# Patient Record
Sex: FEMALE | Race: BLACK OR AFRICAN AMERICAN | Employment: OTHER | URBAN - METROPOLITAN AREA
[De-identification: names, ages, dates, MRNs, and addresses within clinical notes are randomized per-mention and may not be internally consistent; named-entity substitution may affect disease eponyms.]

---

## 2018-10-03 ENCOUNTER — ED HISTORICAL/CONVERTED ENCOUNTER (OUTPATIENT)
Dept: OTHER | Age: 28
End: 2018-10-03

## 2018-10-21 ENCOUNTER — ED HISTORICAL/CONVERTED ENCOUNTER (OUTPATIENT)
Dept: OTHER | Age: 28
End: 2018-10-21

## 2018-10-25 LAB
ANTIBODY SCREEN, EXTERNAL: NEGATIVE
CHLAMYDIA, EXTERNAL: NEGATIVE
HBSAG, EXTERNAL: NEGATIVE
HCT, EXTERNAL: 33.2
HGB, EXTERNAL: 11.4
HIV, EXTERNAL: NON REACTIVE
N. GONORRHEA, EXTERNAL: NEGATIVE
PLATELET CNT,   EXTERNAL: 326
RUBELLA, EXTERNAL: NEGATIVE
T. PALLIDUM, EXTERNAL: NEGATIVE
TYPE, ABO & RH, EXTERNAL: NORMAL
VARICELLA, EXTERNAL: NORMAL

## 2019-01-02 ENCOUNTER — ROUTINE PRENATAL (OUTPATIENT)
Dept: OBGYN CLINIC | Age: 29
End: 2019-01-02

## 2019-01-02 ENCOUNTER — OFFICE VISIT (OUTPATIENT)
Dept: OBGYN CLINIC | Age: 29
End: 2019-01-02

## 2019-01-02 VITALS — SYSTOLIC BLOOD PRESSURE: 116 MMHG | BODY MASS INDEX: 24.03 KG/M2 | WEIGHT: 140 LBS | DIASTOLIC BLOOD PRESSURE: 65 MMHG

## 2019-01-02 VITALS
WEIGHT: 140.2 LBS | SYSTOLIC BLOOD PRESSURE: 119 MMHG | HEIGHT: 64 IN | DIASTOLIC BLOOD PRESSURE: 65 MMHG | BODY MASS INDEX: 23.93 KG/M2

## 2019-01-02 DIAGNOSIS — Z34.82 ENCOUNTER FOR SUPERVISION OF OTHER NORMAL PREGNANCY IN SECOND TRIMESTER: Primary | ICD-10-CM

## 2019-01-02 DIAGNOSIS — Z34.92 NORMAL PREGNANCY IN SECOND TRIMESTER: Primary | ICD-10-CM

## 2019-01-02 PROBLEM — O44.02 PLACENTA PREVIA ANTEPARTUM IN SECOND TRIMESTER: Status: ACTIVE | Noted: 2019-01-02

## 2019-01-02 RX ORDER — ONDANSETRON 4 MG/1
4 TABLET, FILM COATED ORAL
COMMUNITY
End: 2019-03-18 | Stop reason: ALTCHOICE

## 2019-01-02 NOTE — PROGRESS NOTES
Chief Complaint Patient presents with  Pregnancy Visit Vitals /65 Wt 140 lb (63.5 kg) LMP 08/11/2018 (Approximate) BMI 24.03 kg/m² FETAL SURVEY A SINGLE VIABLE IUP AT 20W4D GA BY LMP. FETAL CARDIAC MOTION OBSERVED. FETAL ANATOMY WELL VISUALIZED AND APPEARS WITHIN NORMAL LIMITS. NO ABNORMALITIES IDENTIFIED ON TODAYS EXAM. APPROPRIATE GROWTH MEASURED; SIZE = DATES. EASTON AND CERVIX APPEAR WITHIN NORMAL LIMITS. THERE APPEARS TO BE PARTIAL VS COMPLETE PLACENTA PREVIA.  
GENDER: XX

## 2019-01-02 NOTE — PROGRESS NOTES
Subjective:  
 
Luis Frankel is being seen today for transfer of care from [facility]. Desires midwifery care and NCB. This not a planned pregnancy. Her LMP was approximately 8/25/18. With an EDC of  5/20/18 by first trimester US Her obstetrical history is significant for placenta previa seen on US today. Her medical history is benign Her current medications include: Prenatal vitamins, zofran Denies travel to [place] affected area. Last pap early in pregnancy History fully reviewed- see chart ROS negative other than occasional nausea Objective:  
 
Refer to Prenatal Flowsheet and Physical for exam findings. Fundal Height 20 The patient appears well, alert, oriented x 3, in no distress. Visit Vitals /65 Wt 140 lb (63.5 kg) LMP 08/11/2018 (Approximate) BMI 24.03 kg/m² Neck supple. No adenopathy or thyromegaly. Lungs are clear, good air entry, no wheezes, rhonchi or rales. S1 and S2 normal, no murmurs, regular rate and rhythm. Abdomen soft without tenderness, guarding, mass or organomegaly. Extremities show no edema. Neurological is normal, no focal findings. BREAST EXAM: not examined PELVIC EXAM: examination not indicated Pap smear collected Assessment:  
 
Pregnancy at  20 and 4/7 weeks Placenta previa Plan: Will follow placental attachment. Discussed bleeding precautions with patient Will review prenatal record from [facility]. Patient requesting records be sent Continue prenatal vitamins. Add Omega 3 Fatty acids Midwifery care/philosophy discussed including MD collaboration New OB packet given and reviewed, including nutrition, exercise, what to expect at prenatal visits, common complaints, danger signs and practice info. Invited to Centering pregnancy group starting tomorrow. Given brochure with provider, dates, and time. At this time patient desires. Follow-up in 3 weeks

## 2019-01-02 NOTE — PROGRESS NOTES
Subjective:     Rodolph Gilford is being seen today for transfer of care from [facility]. This not a planned pregnancy. Her LMP was approximately 8/25/18. With an [facility] of  5/20/19    Her obstetrical history is significant for placenta previa noted on US today. Her current medications include: Prenatal vitamins, occasional zof  She is seeking midwifery care. Desires [facility]  Denies travel to [place] affected area. Last pap in early pregnancy  Patient is in the [employer]. She is living in the [facility] at [facility]. The FOB is involved. History fully reviewed- see chart      ROS negative other than nausea a couple of times a week    Objective:     Refer to Prenatal Flowsheet and Physical for exam findings. Fundal Height 21    The patient appears well, alert, oriented x 3, in no distress. Visit Vitals  /65   Ht 5' 4\" (1.626 m)   Wt 140 lb 3.2 oz (63.6 kg)   LMP 08/11/2018 (Approximate)   BMI 24.07 kg/m²     Neck supple. No adenopathy or thyromegaly. Lungs are clear, good air entry, no wheezes, rhonchi or rales. S1 and S2 normal, no murmurs, regular rate and rhythm. Abdomen soft without tenderness, guarding, mass or organomegaly. Extremities show no edema. Neurological is normal, no focal findings. BREAST EXAM: not examined    PELVIC EXAM: examination not indicated       Assessment:     Pregnancy at  20 and 4/7 weeks  Placenta previa    Plan:   Welcomed to practice. Give New OB packet and contact info  Continue prenatal vitamins. Add Omega 3 fatty acids  Will follow placenta attachment. Discussed briefly with patient    Midwifery care/philosophy discussed including MD collaboration  New OB packet given and reviewed, including nutrition, exercise, what to expect at prenatal visits, common complaints, danger signs and practice info. Invited to Centering pregnancy group stating tomorrow. Given brochure with provider, dates, and time. At this time patient desire.    Follow-up in 3 weeks if unable to make Centering tomorrow

## 2019-01-24 ENCOUNTER — ROUTINE PRENATAL (OUTPATIENT)
Dept: OBGYN CLINIC | Age: 29
End: 2019-01-24

## 2019-01-24 VITALS — DIASTOLIC BLOOD PRESSURE: 59 MMHG | BODY MASS INDEX: 25.23 KG/M2 | WEIGHT: 147 LBS | SYSTOLIC BLOOD PRESSURE: 113 MMHG

## 2019-01-24 DIAGNOSIS — Z34.82 ENCOUNTER FOR SUPERVISION OF OTHER NORMAL PREGNANCY IN SECOND TRIMESTER: Primary | ICD-10-CM

## 2019-01-25 ENCOUNTER — TELEPHONE (OUTPATIENT)
Dept: OBGYN CLINIC | Age: 29
End: 2019-01-25

## 2019-01-25 NOTE — TELEPHONE ENCOUNTER
CHRISTINE Ashley, ROSIN   Caller: Unspecified (Today,  9:56 AM)             Spoke to patient on the phone.  She reported that she is feeling better.  States she would not have called [place] \"checking all the boxes. \"   Reviewed management of round ligament pain.    Will call if the symptoms persist.

## 2019-01-25 NOTE — TELEPHONE ENCOUNTER
Received call from NP at [facility], advised that patient who is a , 23w6d pregnant, C/O LLQ pain that feels like stretching or pulling. She reports that the pain feels better when laying down but pain increases when positional changes and movement. Patient has not tried medication or a hot shower/bath. Patient denies any other problems with the pregnancy. Patient was seen in the office yesterday and she states that she had the pain yesterday but was not to painful. Today she reports the same. Patient advised that this could be round ligament pain and that she should take 2 tylenol q 6 hours and try a warm compress and or a hot shower/warm bath. Patient requested to see what the provider states and call her back. Please advise.

## 2019-01-25 NOTE — PROGRESS NOTES
Centering session 2  Patricio Agarwal PT and Leslie Quezada, PT present  Reviewed exercise recommendations, body mechanics  Reviewed pregnancy changes and common discomforts  Dental care  Emotional changes in pregnancy discussed    Pt is doing well, no complaints. Discussed need for repeat US for previa at 28 weeks gestation.   Will schedule at next visit  See flowsheet    RTO 3 weeks

## 2019-02-14 DIAGNOSIS — O44.02 PLACENTA PREVIA ANTEPARTUM IN SECOND TRIMESTER: Primary | ICD-10-CM

## 2019-02-18 ENCOUNTER — ROUTINE PRENATAL (OUTPATIENT)
Dept: OBGYN CLINIC | Age: 29
End: 2019-02-18

## 2019-02-18 VITALS — DIASTOLIC BLOOD PRESSURE: 64 MMHG | SYSTOLIC BLOOD PRESSURE: 111 MMHG | BODY MASS INDEX: 26.26 KG/M2 | WEIGHT: 153 LBS

## 2019-02-18 DIAGNOSIS — Z34.82 ENCOUNTER FOR SUPERVISION OF OTHER NORMAL PREGNANCY IN SECOND TRIMESTER: ICD-10-CM

## 2019-02-18 DIAGNOSIS — Z34.80 SUPERVISION OF OTHER NORMAL PREGNANCY, ANTEPARTUM: Primary | ICD-10-CM

## 2019-02-18 DIAGNOSIS — Z23 ENCOUNTER FOR IMMUNIZATION: ICD-10-CM
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
## 2019-02-19 LAB
ERYTHROCYTE [DISTWIDTH] IN BLOOD BY AUTOMATED COUNT: 13.9 % (ref 12.3–15.4)
GLUCOSE 1H P 50 G GLC PO SERPL-MCNC: 91 MG/DL (ref 65–139)
HCT VFR BLD AUTO: 29.4 % (ref 34–46.6)
HGB BLD-MCNC: 10 G/DL (ref 11.1–15.9)
MCH RBC QN AUTO: 30 PG (ref 26.6–33)
MCHC RBC AUTO-ENTMCNC: 34 G/DL (ref 31.5–35.7)
MCV RBC AUTO: 88 FL (ref 79–97)
PLATELET # BLD AUTO: 302 X10E3/UL (ref 150–379)
RBC # BLD AUTO: 3.33 X10E6/UL (ref 3.77–5.28)
WBC # BLD AUTO: 7.7 X10E3/UL (ref 3.4–10.8)

## 2019-02-19 NOTE — PROGRESS NOTES
Spoke with Polina and informed of anemia and need to start iron supplement and vitamin C and encouraged to increase intake of vitamin C. Also informed of normal glucose screening.

## 2019-03-04 ENCOUNTER — ROUTINE PRENATAL (OUTPATIENT)
Dept: OBGYN CLINIC | Age: 29
End: 2019-03-04

## 2019-03-04 VITALS — BODY MASS INDEX: 27.64 KG/M2 | DIASTOLIC BLOOD PRESSURE: 68 MMHG | WEIGHT: 161 LBS | SYSTOLIC BLOOD PRESSURE: 121 MMHG

## 2019-03-04 DIAGNOSIS — O44.02 PLACENTA PREVIA ANTEPARTUM IN SECOND TRIMESTER: ICD-10-CM

## 2019-03-04 DIAGNOSIS — Z34.02 ENCOUNTER FOR SUPERVISION OF NORMAL FIRST PREGNANCY IN SECOND TRIMESTER: ICD-10-CM

## 2019-03-04 NOTE — PROGRESS NOTES
S: Feeling well. No significant complaints or concerns. Reports consistent, daily fetal mvmt. Denies ctxs, LOF, or vaginal bldng. Denies travel to [place] affected area. O: See prenatal flowsheet for maternal and fetal exam  Blood pressure 121/68, weight 161 lb (73 kg), last menstrual period 2018. A:  29w2d   Size=Dates    P: reviewed Us - Previa resolved! See prenatal checklist for other topics covered during today's visit  RTO in 2 weeks for MAURY with CNM  Patient is in [address]. Having difficulties driving home after group since she is staying in [address].

## 2019-03-04 NOTE — PROGRESS NOTES
Chief Complaint   Patient presents with    Routine Prenatal Visit     29w2d     Visit Vitals  /68   Wt 161 lb (73 kg)   LMP 08/11/2018 (Approximate)   BMI 27.64 kg/m²     1. Have you been to the ER, urgent care clinic since your last visit? Hospitalized since your last visit? No    2. Have you seen or consulted any other health care providers outside of the [address] [place] [address] since your last visit? Include any pap smears or colon screening. No     Here today for a routine prenatal visit and she has no complaints or concerns.

## 2019-03-07 ENCOUNTER — OP HISTORICAL/CONVERTED ENCOUNTER (OUTPATIENT)
Dept: OTHER | Age: 29
End: 2019-03-07

## 2019-03-18 ENCOUNTER — ROUTINE PRENATAL (OUTPATIENT)
Dept: OBGYN CLINIC | Age: 29
End: 2019-03-18

## 2019-03-18 VITALS
HEART RATE: 90 BPM | HEIGHT: 64 IN | SYSTOLIC BLOOD PRESSURE: 116 MMHG | DIASTOLIC BLOOD PRESSURE: 69 MMHG | WEIGHT: 164.6 LBS | BODY MASS INDEX: 28.1 KG/M2

## 2019-03-18 DIAGNOSIS — O99.013 ANEMIA AFFECTING PREGNANCY IN THIRD TRIMESTER: ICD-10-CM

## 2019-03-18 DIAGNOSIS — Z34.92 ENCOUNTER FOR SUPERVISION OF NORMAL PREGNANCY IN SECOND TRIMESTER, UNSPECIFIED GRAVIDITY: ICD-10-CM

## 2019-03-18 PROBLEM — O44.02 PLACENTA PREVIA ANTEPARTUM IN SECOND TRIMESTER: Status: RESOLVED | Noted: 2019-01-02 | Resolved: 2019-03-18

## 2019-03-18 NOTE — PROGRESS NOTES
S: Feeling well. No significant complaints or concerns except fatigue. Reports consistent, daily fetal mvmt. Denies ctxs, LOF, or vaginal bldng. [place] [facility] [facility] [place]. O: See prenatal flowsheet for maternal and fetal exam. Reviewed importance of taking supplement for anemia. Has issues taking pills. Will start Floradix supplement BID with orange juice. Blood pressure 116/69, pulse 90, height 5' 4\" (1.626 m), weight 164 lb 9.6 oz (74.7 kg), last menstrual period 2018. A:  31w2d   Size=Dates    PReviewed labor precautions and distance of travel since now lives in [facility] and has concerns with travel distance. Breast feeding class encouraged.    See prenatal checklist for other topics covered during today's visit  RTO in 2 weeks for MAURY with CHRISTINE

## 2019-04-02 ENCOUNTER — ROUTINE PRENATAL (OUTPATIENT)
Dept: OBGYN CLINIC | Age: 29
End: 2019-04-02

## 2019-04-02 VITALS — DIASTOLIC BLOOD PRESSURE: 74 MMHG | WEIGHT: 166 LBS | SYSTOLIC BLOOD PRESSURE: 118 MMHG | BODY MASS INDEX: 28.49 KG/M2

## 2019-04-02 DIAGNOSIS — Z34.03 ENCOUNTER FOR SUPERVISION OF NORMAL FIRST PREGNANCY IN THIRD TRIMESTER: ICD-10-CM

## 2019-04-02 DIAGNOSIS — O99.013 ANEMIA AFFECTING PREGNANCY IN THIRD TRIMESTER: Primary | ICD-10-CM

## 2019-04-02 RX ORDER — LANOLIN ALCOHOL/MO/W.PET/CERES
CREAM (GRAM) TOPICAL 2 TIMES DAILY
COMMUNITY

## 2019-04-02 NOTE — PROGRESS NOTES
Chief Complaint   Patient presents with    Routine Prenatal Visit     Visit Vitals  /74   Wt 166 lb (75.3 kg)   LMP 08/11/2018 (Approximate)   BMI 28.49 kg/m²     1. Have you been to the ER, urgent care clinic since your last visit? Hospitalized since your last visit? No    2. Have you seen or consulted any other health care providers outside of the [address] [place] [address] since your last visit? Include any pap smears or colon screening. No     Here today for a routine prenatal visit and she has no complaints or concerns.

## 2019-04-16 ENCOUNTER — ROUTINE PRENATAL (OUTPATIENT)
Dept: OBGYN CLINIC | Age: 29
End: 2019-04-16

## 2019-04-16 VITALS — DIASTOLIC BLOOD PRESSURE: 72 MMHG | BODY MASS INDEX: 29.18 KG/M2 | WEIGHT: 170 LBS | SYSTOLIC BLOOD PRESSURE: 121 MMHG

## 2019-04-16 DIAGNOSIS — Z34.03 ENCOUNTER FOR SUPERVISION OF NORMAL FIRST PREGNANCY IN THIRD TRIMESTER: ICD-10-CM

## 2019-04-16 DIAGNOSIS — Z34.82 ENCOUNTER FOR SUPERVISION OF OTHER NORMAL PREGNANCY IN SECOND TRIMESTER: Primary | ICD-10-CM

## 2019-04-16 NOTE — LETTER
4/16/2019 9:55 AM 
 
Ms. Nugent [address] [address] To whom it may concern: 
 
Due to Ms Prieto's condition of pregnancy nearing term, I recommend that she reduce her work schedule to 4 days per week. Sincerely, 
 
 
Shanda Loss, CNM

## 2019-04-16 NOTE — PROGRESS NOTES
Chief Complaint   Patient presents with    Routine Prenatal Visit     Visit Vitals  /72   Wt 170 lb (77.1 kg)   LMP 08/11/2018 (Approximate)   BMI 29.18 kg/m²     1. Have you been to the ER, urgent care clinic since your last visit? Hospitalized since your last visit? No    2. Have you seen or consulted any other health care providers outside of the [address] [place] [address] since your last visit? Include any pap smears or colon screening. No     Here today for a routine prenatal visit and she has no complaints or concerns.   gbs today
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
## 2019-04-22 ENCOUNTER — ROUTINE PRENATAL (OUTPATIENT)
Dept: OBGYN CLINIC | Age: 29
End: 2019-04-22

## 2019-04-22 VITALS — SYSTOLIC BLOOD PRESSURE: 122 MMHG | WEIGHT: 172 LBS | BODY MASS INDEX: 29.52 KG/M2 | DIASTOLIC BLOOD PRESSURE: 67 MMHG

## 2019-04-22 DIAGNOSIS — Z34.82 ENCOUNTER FOR SUPERVISION OF OTHER NORMAL PREGNANCY IN SECOND TRIMESTER: Primary | ICD-10-CM

## 2019-04-22 NOTE — PROGRESS NOTES
S: Feeling well. No significant complaints or concerns. Reports consistent, daily fetal mvmt. Denies ctxs, LOF, or vaginal bldng. Stopping work next week. Will be staying with her parents in [address]  Denies travel to [place] affected area. O: See prenatal flowsheet for maternal and fetal exam  Blood pressure 122/67, weight 172 lb (78 kg), last menstrual period 2018.     A:  36w2d   Size=Dates  Fetus OP    P: GBS Cx obtained  Discussed optimal fetal positioning  See prenatal checklist for other topics covered during today's visit  RTO in 1 weeks for MAURY with CHRISTINE

## 2019-04-22 NOTE — PROGRESS NOTES
Chief Complaint   Patient presents with    Routine Prenatal Visit     Visit Vitals  /67   Wt 172 lb (78 kg)   LMP 08/11/2018 (Approximate)   BMI 29.52 kg/m²     1. Have you been to the ER, urgent care clinic since your last visit? Hospitalized since your last visit? No    2. Have you seen or consulted any other health care providers outside of the [facility] since your last visit? Include any pap smears or colon screening. No     Here today for a routine prenatal visit and she has no complaints or concerns.

## 2019-04-24 LAB
GP B STREP DNA SPEC QL NAA+PROBE: NEGATIVE
GRBS, EXTERNAL: NEGATIVE

## 2019-04-28 ENCOUNTER — HOSPITAL ENCOUNTER (EMERGENCY)
Age: 29
Discharge: HOME | End: 2019-04-28
Attending: EMERGENCY MEDICINE | Admitting: EMERGENCY MEDICINE
Payer: OTHER GOVERNMENT

## 2019-04-28 VITALS
OXYGEN SATURATION: 99 % | TEMPERATURE: 98.3 F | WEIGHT: 170 LBS | DIASTOLIC BLOOD PRESSURE: 58 MMHG | SYSTOLIC BLOOD PRESSURE: 117 MMHG | BODY MASS INDEX: 29.02 KG/M2 | HEART RATE: 93 BPM | RESPIRATION RATE: 16 BRPM | HEIGHT: 64 IN

## 2019-04-28 DIAGNOSIS — S91.011A LACERATION OF RIGHT ANKLE, INITIAL ENCOUNTER: Primary | ICD-10-CM

## 2019-04-28 PROCEDURE — 99282 EMERGENCY DEPT VISIT SF MDM: CPT
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
## 2019-04-28 NOTE — DISCHARGE INSTRUCTIONS
Patient Education     Wound Care: After Your Visit  Your Care Instructions  Taking good care of your wound at home will help it heal quickly and reduce your chance of infection. The doctor has checked you carefully, but problems can develop later. If you notice any problems or new symptoms, get medical treatment right away. Follow-up care is a key part of your treatment and safety. Be sure to make and go to all appointments, and call your doctor if you are having problems. It's also a good idea to know your test results and keep a list of the medicines you take. How can you care for yourself at home? · Clean the area with soap and water 2 times a day unless your doctor gives you different instructions. Don't use hydrogen peroxide or alcohol, which can slow healing. ¨ You may cover the wound with a thin layer of antibiotic ointment, such as bacitracin, and a nonstick bandage. ¨ Apply more ointment and replace the bandage as needed. · Take pain medicines exactly as directed. Some pain is normal with a wound, but do not ignore pain that is getting worse instead of better. You could have an infection. ¨ If the doctor gave you a prescription medicine for pain, take it as prescribed. ¨ If you are not taking a prescription pain medicine, ask your doctor if you can take an over-the-counter medicine. · Your doctor may have closed your wound with stitches (sutures), staples, or skin glue. ¨ If you have stitches, your doctor may remove them after several days to 2 weeks. Or you may have stitches that dissolve on their own. ¨ If you have staples, your doctor may remove them after 7 to 10 days. ¨ If your wound was closed with skin glue, the glue will wear off in a few days to 2 weeks. When should you call for help? Call your doctor now or seek immediate medical care if:  · You have signs of infection, such as:  ¨ Increased pain, swelling, warmth, or redness near the wound.   ¨ Red streaks leading from the wound.  ¨ Pus draining from the wound. ¨ A fever. · You bleed so much from your incision that you soak one or more bandages over 2 to 4 hours. Watch closely for changes in your health, and be sure to contact your doctor if:  · The wound is not getting better each day. Where can you learn more? Go to [employer]  Enter M973 in the search box to learn more about \"Wound Care: After Your Visit. \"   © 8086-7684 Health[employer], [employer]orporated. Care instructions adapted under license by [facility] (which disclaims liability or warranty for this information). This care instruction is for use with your licensed healthcare professional. If you have questions about a medical condition or this instruction, always ask your healthcare professional. [address] any warranty or liability for your use of this information. Content Version: 10.5.847283; Last Revised: April 23, 2012                    Cuts: Care Instructions  Your Care Instructions  A cut can happen anywhere on your body. Stitches, staples, skin adhesives, or pieces of tape called Steri-Strips are sometimes used to keep the edges of a cut together and help it heal. Steri-Strips can be used by themselves or with stitches or staples. Sometimes cuts are left open. If the cut went deep and through the skin, the doctor may have closed the cut in two layers. A deeper layer of stitches brings the deep part of the cut together. These stitches will dissolve and don't need to be removed. The upper layer closure, which could be stitches, staples, Steri-Strips, or adhesive, is what you see on the cut. A cut is often covered by a bandage. The doctor has checked you carefully, but problems can develop later. If you notice any problems or new symptoms, get medical treatment right away. Follow-up care is a key part of your treatment and safety.  Be sure to make and go to all appointments, and call your doctor if you are having problems. It's also a good idea to know your test results and keep a list of the medicines you take. How can you care for yourself at home? If a cut is open or closed  · Prop up the sore area on a pillow anytime you sit or lie down during the next 3 days. Try to keep it above the level of your heart. This will help reduce swelling. · Keep the cut dry for the first 24 to 48 hours. After this, you can shower if your doctor okays it. Pat the cut dry. · Don't soak the cut, such as in a bathtub. Your doctor will tell you when it's safe to get the cut wet. · After the first 24 to 48 hours, clean the cut with soap and water 2 times a day unless your doctor gives you different instructions. ? Don't use hydrogen peroxide or alcohol, which can slow healing. ? You may cover the cut with a thin layer of petroleum jelly and a nonstick bandage. ? If the doctor put a bandage over the cut, put on a new bandage after cleaning the cut or if the bandage gets wet or dirty. · Avoid any activity that could cause your cut to reopen. · Be safe with medicines. Read and follow all instructions on the label. ? If the doctor gave you a prescription medicine for pain, take it as prescribed. ? If you are not taking a prescription pain medicine, ask your doctor if you can take an over-the-counter medicine. If the cut is closed with stitches, staples, or Steri-Strips  · Follow the above instructions for open or closed cuts. · Do not remove the stitches or staples on your own. Your doctor will tell you when to come back to have the stitches or staples removed. · Leave Steri-Strips on until they fall off. If the cut is closed with a skin adhesive  · Follow the above instructions for open or closed cuts. · Leave the skin adhesive on your skin until it falls off on its own. This may take 5 to 10 days. · Do not scratch, rub, or pick at the adhesive. · Do not put the sticky part of a bandage directly on the adhesive.   · Do not put any kind of ointment, cream, or lotion over the area. This can make the adhesive fall off too soon. Do not use hydrogen peroxide or alcohol, which can slow healing. When should you call for help? Call your doctor now or seek immediate medical care if:    · You have new pain, or your pain gets worse.     · The skin near the cut is cold or pale or changes color.     · You have tingling, weakness, or numbness near the cut.     · The cut starts to bleed, and blood soaks through the bandage. Oozing small amounts of blood is normal.     · You have trouble moving the area near the cut.     · You have symptoms of infection, such as:  ? Increased pain, swelling, warmth, or redness around the cut.  ? Red streaks leading from the cut.  ? Pus draining from the cut.  ? A fever.    Watch closely for changes in your health, and be sure to contact your doctor if:    · The cut reopens.     · You do not get better as expected. Where can you learn more? Go to http://lalo-humberto.info/. Enter M735 in the search box to learn more about \"Cuts: Care Instructions. \"  Current as of: September 23, 2018  Content Version: 11.9  © 0118-6588 [employer]. Care instructions adapted under license by [employer] (which disclaims liability or warranty for this information). If you have questions about a medical condition or this instruction, always ask your healthcare professional. [address] any warranty or liability for your use of this information.

## 2019-05-01 ENCOUNTER — ROUTINE PRENATAL (OUTPATIENT)
Dept: OBGYN CLINIC | Age: 29
End: 2019-05-01

## 2019-05-01 VITALS — BODY MASS INDEX: 29.18 KG/M2 | SYSTOLIC BLOOD PRESSURE: 126 MMHG | DIASTOLIC BLOOD PRESSURE: 75 MMHG | WEIGHT: 170 LBS

## 2019-05-01 DIAGNOSIS — Z34.03 ENCOUNTER FOR SUPERVISION OF NORMAL FIRST PREGNANCY IN THIRD TRIMESTER: Primary | ICD-10-CM

## 2019-05-01 NOTE — PROGRESS NOTES
Chief Complaint   Patient presents with    Routine Prenatal Visit     37w4d     Visit Vitals  /75   Wt 170 lb (77.1 kg)   LMP 08/11/2018 (Approximate)   BMI 29.18 kg/m²     1. Have you been to the ER, urgent care clinic since your last visit? Hospitalized since your last visit? No    2. Have you seen or consulted any other health care providers outside of the [address] [place] [address] since your last visit? Include any pap smears or colon screening. No     Here today for a routine prenatal visit and she has no complaints or concerns.

## 2019-05-01 NOTE — PROGRESS NOTES
S: Feeling well. No significant complaints or concerns. Reports consistent, daily fetal mvmt. Denies ctxs, LOF, or vaginal bldng. Having [place] contractions  Denies travel to [facility] affected area. O: See prenatal flowsheet for maternal and fetal exam  Blood pressure 126/75, weight 170 lb (77.1 kg), last menstrual period 2018.     A:  37w4d   Size=Dates    P: reviewed GBS negative result  See prenatal checklist for other topics covered during today's visit  RTO in 1 weeks for MAURY with CNM

## 2019-05-08 ENCOUNTER — ROUTINE PRENATAL (OUTPATIENT)
Dept: OBGYN CLINIC | Age: 29
End: 2019-05-08

## 2019-05-08 VITALS — BODY MASS INDEX: 29.7 KG/M2 | WEIGHT: 173 LBS | SYSTOLIC BLOOD PRESSURE: 126 MMHG | DIASTOLIC BLOOD PRESSURE: 80 MMHG

## 2019-05-08 DIAGNOSIS — Z34.03 ENCOUNTER FOR SUPERVISION OF NORMAL FIRST PREGNANCY IN THIRD TRIMESTER: Primary | ICD-10-CM

## 2019-05-08 NOTE — PROGRESS NOTES
S: Feeling well. No significant complaints or concerns. Reports consistent, daily fetal mvmt. Denies ctxs, LOF, or vaginal bldng. Denies travel to [facility] affected area. O: See prenatal flowsheet for maternal and fetal exam  Blood pressure 126/80, weight 173 lb (78.5 kg), last menstrual period 2018. A:  38w4d   Size=Dates    P: labor precautions, fkcs. Spinning babies encouraged.    See prenatal checklist for other topics covered during today's visit  RTO in 1 weeks for MAURY with CHRISTINE

## 2019-05-08 NOTE — PATIENT INSTRUCTIONS
Week 39 of Your Pregnancy: Care Instructions  Your Care Instructions    During these final weeks, you may feel anxious to see your new baby. [place] babies often look different from what you see in pictures or movies. Right after birth, their heads may have a strange shape. Their eyes may be puffy. And their genitals may be swollen. They may also have very dry skin, or red marks on the eyelids, nose, or neck. Still, most parents think their babies are beautiful. Follow-up care is a key part of your treatment and safety. Be sure to make and go to all appointments, and call your doctor if you are having problems. It's also a good idea to know your test results and keep a list of the medicines you take. How can you care for yourself at home? Prepare to breastfeed  · If you are breastfeeding, continue to eat healthy foods. · If your health care provider recommends it, keep taking your prenatal vitamins. · Talk to your doctor before you take any medicine or supplement. That's because some medicines can affect your breast milk. · You can help prevent sore nipples if you feed your baby in the correct position. Nurses will help you learn to do this. · Your  will need to be fed about every 1 to 3 hours. Choose the right birth control after your baby is born  · Women who are breastfeeding can still get pregnant. Use birth control if you don't want to get pregnant. · Intrauterine devices (IUDs) work for women who want to wait at least 2 years before getting pregnant again. They are safe to use while you are breastfeeding. · Depo-Provera can be used while you are breastfeeding. It is a shot you get every 3 months. · Birth control pills work well. But you need a different kind of pill while you are breastfeeding. And when you start taking these pills, you need to make sure to use another type of birth control until you start your second pack.   · Diaphragms, cervical caps, tubal implants, and condoms with spermicide work less well after birth. If you have a diaphragm or cervical cap, you will need to have it refitted. · Tubal ligation (tying your tubes) and vasectomy are both permanent. These are good options if you are sure you are done having children. Where can you learn more? Go to http://lalo-humberto.info/. Enter F390 in the search box to learn more about \"Week 39 of Your Pregnancy: Care Instructions. \"  Current as of: 2018  Content Version: 11.9  © 9854-1407 [employer]. Care instructions adapted under license by [employer] (which disclaims liability or warranty for this information). If you have questions about a medical condition or this instruction, always ask your healthcare professional. [address] any warranty or liability for your use of this information. Week 38 of Your Pregnancy: Care Instructions  Your Care Instructions    Believe it or not, your baby is almost here. You may have ideas about your baby's personality because of how much he or she moves. Or you may have noticed how he or she responds to sounds, warmth, cold, and light. You may even know what kind of music your baby likes. By now, you have a better idea of what to expect during delivery. You may have talked about your birth preferences with your doctor. But even if you want a vaginal birth, it is a good idea to learn about  births.  birth means that your baby is born through a cut (incision) in your lower belly. It is sometimes the best choice for the health of the baby and the mother. This care sheet can help you understand  births. It also gives you information about what to expect after your baby is born. And it helps you understand more about postpartum depression. Follow-up care is a key part of your treatment and safety. Be sure to make and go to all appointments, and call your doctor if you are having problems. It's also a good idea to know your test results and keep a list of the medicines you take. How can you care for yourself at home? Learn about  birth  · Most C-sections are unplanned. They are done because of problems that occur during labor. These problems might include:  ? Labor that slows or stops. ? High blood pressure or other problems for the mother. ? Signs of distress in the baby. These signs may include a very fast or slow heart rate. · Although most mothers and babies do well after , it is major surgery. It has more risks than a vaginal delivery. · In some cases, a planned  may be safer than a vaginal delivery. This may be the case if:  ? The mother has a health problem, such as a heart condition. ? The baby isn't in a head-down position for delivery. This is called a breech position. ? The uterus has scars from past surgeries. This could increase the chance of a tear in the uterus. ? There is a problem with the placenta. ? The mother has an infection, such as genital herpes, that could be spread to the baby. ? The mother is having twins or more. ? The baby weighs 9 to 10 pounds or more. · Because of the risks of , planned C-sections generally should be done only for medical reasons. And a planned  should be done at 39 weeks or later unless there is a medical reason to do it sooner. Know what to expect after delivery, and plan for the first few weeks at home  · You, your baby, and your partner or  will get identification bands. Only people with matching bands can  the baby from the nursery. · You will learn how to feed, diaper, and bathe your baby. And you will learn how to care for the umbilical cord stump. If your baby will be circumcised, you will also learn how to care for that. · Ask people to wait to visit you until you are at home. And ask them to wash their hands before they touch your baby.   · Make sure you have another adult in your home for at least 2 or 3 days after the birth. · During the first 2 weeks, limit when friends and family can visit. · Do not allow visitors who have colds or infections. Make sure all visitors are up to date with their vaccinations. Never let anyone smoke around your baby. · Try to nap when the baby naps. Be aware of postpartum depression  · \"Baby blues\" are common for the first 1 to 2 weeks after birth. You may cry or feel sad or irritable for no reason. · For some women, these feelings last longer and are more intense. This is called postpartum depression. · If your symptoms last for more than a few weeks or you feel very depressed, ask your doctor for help. · Postpartum depression can be treated. Support groups and counseling can help. Sometimes medicine can also help. Where can you learn more? Go to http://lalo-humberto.info/. Enter B044 in the search box to learn more about \"Week 38 of Your Pregnancy: Care Instructions. \"  Current as of: September 5, 2018  Content Version: 11.9  © 4421-1872 Healthwise, Incorporated. Care instructions adapted under license by [employer] (which disclaims liability or warranty for this information). If you have questions about a medical condition or this instruction, always ask your healthcare professional. [address] any warranty or liability for your use of this information.

## 2019-05-08 NOTE — PROGRESS NOTES
Chief Complaint   Patient presents with    Routine Prenatal Visit     Visit Vitals  /80   Wt 173 lb (78.5 kg)   LMP 08/11/2018 (Approximate)   BMI 29.70 kg/m²     1. Have you been to the ER, urgent care clinic since your last visit? Hospitalized since your last visit? No    2. Have you seen or consulted any other health care providers outside of the [address] [place] [address] since your last visit? Include any pap smears or colon screening. No    Here today for a routine prenatal visit and she has no complaints or concerns.

## 2019-05-14 ENCOUNTER — TELEPHONE (OUTPATIENT)
Dept: OBGYN CLINIC | Age: 29
End: 2019-05-14

## 2019-05-14 NOTE — TELEPHONE ENCOUNTER
Patient calling , 39w3d pregnant, states that she would like to speak with her midwife. This nurse offered her help, but she declined.     She is requesting a return call to [phone]

## 2019-05-15 ENCOUNTER — ROUTINE PRENATAL (OUTPATIENT)
Dept: OBGYN CLINIC | Age: 29
End: 2019-05-15

## 2019-05-15 VITALS
HEIGHT: 64 IN | DIASTOLIC BLOOD PRESSURE: 72 MMHG | SYSTOLIC BLOOD PRESSURE: 120 MMHG | WEIGHT: 178.4 LBS | BODY MASS INDEX: 30.46 KG/M2

## 2019-05-15 DIAGNOSIS — Z34.03 ENCOUNTER FOR SUPERVISION OF NORMAL FIRST PREGNANCY IN THIRD TRIMESTER: Primary | ICD-10-CM

## 2019-05-15 NOTE — TELEPHONE ENCOUNTER
Spoke to patient on the phone. She wants her membranes stripped tomorrow. \"I don't think this baby will ever come. \"  I told her I would check her cervix tomorrow if she wanted. Will assess cervical ripening.

## 2019-05-16 ENCOUNTER — HOSPITAL ENCOUNTER (EMERGENCY)
Age: 29
Discharge: HOME | End: 2019-05-17
Attending: OBSTETRICS & GYNECOLOGY | Admitting: OBSTETRICS & GYNECOLOGY
Payer: OTHER GOVERNMENT

## 2019-05-16 LAB
APPEARANCE UR: ABNORMAL
BILIRUB UR QL: NEGATIVE
COLOR UR: YELLOW
GLUCOSE UR QL STRIP.AUTO: NEGATIVE MG/DL
KETONES UR-MCNC: NEGATIVE MG/DL
LEUKOCYTE ESTERASE UR QL STRIP: NEGATIVE
NITRITE UR QL: NEGATIVE
PH UR: 7 [PH] (ref 5–9)
PROT UR QL: NEGATIVE MG/DL
RBC # UR STRIP: ABNORMAL /UL
SERVICE CMNT-IMP: ABNORMAL
SP GR UR: 1.01 (ref 1–1.02)
UROBILINOGEN UR QL: 0.2 EU/DL (ref 0.2–1)

## 2019-05-16 PROCEDURE — 81003 URINALYSIS AUTO W/O SCOPE: CPT

## 2019-05-17 VITALS
RESPIRATION RATE: 16 BRPM | TEMPERATURE: 98.3 F | DIASTOLIC BLOOD PRESSURE: 72 MMHG | SYSTOLIC BLOOD PRESSURE: 124 MMHG | HEART RATE: 78 BPM

## 2019-05-17 PROCEDURE — 59025 FETAL NON-STRESS TEST: CPT

## 2019-05-17 PROCEDURE — 99282 EMERGENCY DEPT VISIT SF MDM: CPT

## 2019-05-17 NOTE — DISCHARGE INSTRUCTIONS
Patient Education   Patient Education   Patient Education        Week 39 of Your Pregnancy: Care Instructions  Your Care Instructions    During these final weeks, you may feel anxious to see your new baby.  babies often look different from what you see in pictures or movies. Right after birth, their heads may have a strange shape. Their eyes may be puffy. And their genitals may be swollen. They may also have very dry skin, or red marks on the eyelids, nose, or neck. Still, most parents think their babies are beautiful. Follow-up care is a key part of your treatment and safety. Be sure to make and go to all appointments, and call your doctor if you are having problems. It's also a good idea to know your test results and keep a list of the medicines you take. How can you care for yourself at home? Prepare to breastfeed  · If you are breastfeeding, continue to eat healthy foods. · If your health care provider recommends it, keep taking your prenatal vitamins. · Talk to your doctor before you take any medicine or supplement. That's because some medicines can affect your breast milk. · You can help prevent sore nipples if you feed your baby in the correct position. Nurses will help you learn to do this. · Your  will need to be fed about every 1 to 3 hours. Choose the right birth control after your baby is born  · Women who are breastfeeding can still get pregnant. Use birth control if you don't want to get pregnant. · Intrauterine devices (IUDs) work for women who want to wait at least 2 years before getting pregnant again. They are safe to use while you are breastfeeding. · Depo-Provera can be used while you are breastfeeding. It is a shot you get every 3 months. · Birth control pills work well. But you need a different kind of pill while you are breastfeeding.  And when you start taking these pills, you need to make sure to use another type of birth control until you start your second pack.  · Diaphragms, cervical caps, tubal implants, and condoms with spermicide work less well after birth. If you have a diaphragm or cervical cap, you will need to have it refitted. · Tubal ligation (tying your tubes) and vasectomy are both permanent. These are good options if you are sure you are done having children. Where can you learn more? Go to http://lalo-humberto.info/. Enter X068 in the search box to learn more about \"Week 39 of Your Pregnancy: Care Instructions. \"  Current as of: September 5, 2018  Content Version: 11.9  © 6980-7286 [employer]. Care instructions adapted under license by [employer] (which disclaims liability or warranty for this information). If you have questions about a medical condition or this instruction, always ask your healthcare professional. [address] any warranty or liability for your use of this information. Counting Your Baby's Kicks: Care Instructions  Your Care Instructions    Counting your baby's kicks is one way your doctor can tell that your baby is healthy. Most women--especially in a first pregnancy--feel their baby move for the first time between 16 and 22 weeks. The movement may feel like flutters rather than kicks. Your baby may move more at certain times of the day. When you are active, you may notice less kicking than when you are resting. At your prenatal visits, your doctor will ask whether the baby is active. In your last trimester, your doctor may ask you to count the number of times you feel your baby move. Follow-up care is a key part of your treatment and safety. Be sure to make and go to all appointments, and call your doctor if you are having problems. It's also a good idea to know your test results and keep a list of the medicines you take. How do you count fetal kicks?   · A common method of checking your baby's movement is to count the number of kicks or moves you feel in 1 hour. Ten movements (such as kicks, flutters, or rolls) in 1 hour are normal. Some doctors suggest that you count in the morning until you get to 10 movements. Then you can quit for that day and start again the next day. · Pick your baby's most active time of day to count. This may be any time from morning to evening. · If you do not feel 10 movements in an hour, your baby may be sleeping. Wait for the next hour and count again. When should you call for help? Call your doctor now or seek immediate medical care if:    · You noticed that your baby has stopped moving or is moving much less than normal.    Watch closely for changes in your health, and be sure to contact your doctor if you have any problems. Where can you learn more? Go to http://lalo-humberto.info/. Enter M005 in the search box to learn more about \"Counting Your Baby's Kicks: Care Instructions. \"  Current as of: 2018  Content Version: 11.9  © 7294-0405 [employer]. Care instructions adapted under license by [employer] (which disclaims liability or warranty for this information). If you have questions about a medical condition or this instruction, always ask your healthcare professional. [place] [address] any warranty or liability for your use of this information. Pregnancy Precautions: Care Instructions  Your Care Instructions    There is no sure way to prevent labor before your due date ( labor) or to prevent most other pregnancy problems. But there are things you can do to increase your chances of a healthy pregnancy. Go to your appointments, follow your doctor's advice, and take good care of yourself. Eat well, and exercise (if your doctor agrees). And make sure to drink plenty of water. Follow-up care is a key part of your treatment and safety. Be sure to make and go to all appointments, and call your doctor if you are having problems.  It's also a good idea to know your test results and keep a list of the medicines you take. How can you care for yourself at home? · Make sure you go to your prenatal appointments. At each visit, your doctor will check your blood pressure. Your doctor will also check to see if you have protein in your urine. High blood pressure and protein in urine are signs of preeclampsia. This condition can be dangerous for you and your baby. · Drink plenty of fluids, enough so that your urine is light yellow or clear like water. Dehydration can cause contractions. If you have kidney, heart, or liver disease and have to limit fluids, talk with your doctor before you increase the amount of fluids you drink. · Tell your doctor right away if you notice any symptoms of an infection, such as:  ? Burning when you urinate. ? A foul-smelling discharge from your vagina. ? Vaginal itching. ? Unexplained fever. ? Unusual pain or soreness in your uterus or lower belly. · Eat a balanced diet. Include plenty of foods that are high in calcium and iron. ? Foods high in calcium include milk, cheese, yogurt, almonds, and broccoli. ? Foods high in iron include red meat, shellfish, poultry, eggs, beans, raisins, whole-grain bread, and leafy green vegetables. · Do not smoke. If you need help quitting, talk to your doctor about stop-smoking programs and medicines. These can increase your chances of quitting for good. · Do not drink alcohol or use illegal drugs. · Follow your doctor's directions about activity. Your doctor will let you know how much, if any, exercise you can do. · Ask your doctor if you can have sex. If you are at risk for early labor, your doctor may ask you to not have sex. · Take care to prevent falls. During pregnancy, your joints are loose, and your balance is off. Sports such as bicycling, skiing, or in-line skating can increase your risk of falling.  And don't ride horses or motorcycles, dive, water ski, scuba dive, or parachute jump while you are pregnant. · Avoid getting very hot. Do not use saunas or hot tubs. Avoid staying out in the sun in hot weather for long periods. Take acetaminophen (Tylenol) to lower a high fever. · Do not take any over-the-counter or herbal medicines or supplements without talking to your doctor or pharmacist first.  When should you call for help? Call 911 anytime you think you may need emergency care. For example, call if:    · You passed out (lost consciousness).     · You have severe vaginal bleeding.     · You have severe pain in your belly or pelvis.     · You have had fluid gushing or leaking from your vagina and you know or think the umbilical cord is bulging into your vagina. If this happens, immediately get down on your knees so your rear end (buttocks) is higher than your head. This will decrease the pressure on the cord until help arrives.   [facility] your doctor now or seek immediate medical care if:    · You have signs of preeclampsia, such as:  ? Sudden swelling of your face, hands, or feet. ? New vision problems (such as dimness or blurring). ? A severe headache.     · You have any vaginal bleeding.     · You have belly pain or cramping.     · You have a fever.     · You have had regular contractions (with or without pain) for an hour. This means that you have 8 or more within 1 hour or 4 or more in 20 minutes after you change your position and drink fluids.     · You have a sudden release of fluid from your vagina.     · You have low back pain or pelvic pressure that does not go away.     · You notice that your baby has stopped moving or is moving much less than normal.    Watch closely for changes in your health, and be sure to contact your doctor if you have any problems. Where can you learn more? Go to http://lalo-humberto.info/. Enter [id number] in the search box to learn more about \"Pregnancy Precautions: Care Instructions. \"  Current as of: September 5, 2018  Content Version: 11.9  © 9996-5558 [employer]. Care instructions adapted under license by [employer] (which disclaims liability or warranty for this information). If you have questions about a medical condition or this instruction, always ask your healthcare professional. [address] any warranty or liability for your use of this information.

## 2019-05-17 NOTE — PROGRESS NOTES
9074 Patient arrives to unit with complaints of swelling for the past couple days with intermittent headaches. Patient is . 39w5d. 
 
0028 SVE 2. Cornell Patel paged. 36 SBAR discussed with JADEN Patel. Orders received to discharge patient at this time. 0040 I have reviewed discharge instructions with the patient. The patient verbalized understanding.

## 2019-05-18 ENCOUNTER — HOSPITAL ENCOUNTER (INPATIENT)
Age: 29
LOS: 3 days | Discharge: HOME | End: 2019-05-21
Attending: OBSTETRICS & GYNECOLOGY | Admitting: OBSTETRICS & GYNECOLOGY
Payer: OTHER GOVERNMENT

## 2019-05-18 ENCOUNTER — ANESTHESIA EVENT (OUTPATIENT)
Dept: LABOR AND DELIVERY | Age: 29
End: 2019-05-18
Payer: OTHER GOVERNMENT

## 2019-05-18 ENCOUNTER — ANESTHESIA (OUTPATIENT)
Dept: LABOR AND DELIVERY | Age: 29
End: 2019-05-18
Payer: OTHER GOVERNMENT

## 2019-05-18 ENCOUNTER — HOSPITAL ENCOUNTER (EMERGENCY)
Age: 29
Discharge: HOME | End: 2019-05-18
Attending: OBSTETRICS & GYNECOLOGY | Admitting: OBSTETRICS & GYNECOLOGY
Payer: OTHER GOVERNMENT

## 2019-05-18 VITALS
WEIGHT: 178 LBS | DIASTOLIC BLOOD PRESSURE: 77 MMHG | HEIGHT: 64 IN | OXYGEN SATURATION: 100 % | SYSTOLIC BLOOD PRESSURE: 132 MMHG | RESPIRATION RATE: 18 BRPM | BODY MASS INDEX: 30.39 KG/M2 | HEART RATE: 81 BPM | TEMPERATURE: 98.4 F

## 2019-05-18 LAB
BASOPHILS # BLD: 0 K/UL (ref 0–0.1)
BASOPHILS NFR BLD: 0 % (ref 0–1)
DIFFERENTIAL METHOD BLD: ABNORMAL
EOSINOPHIL # BLD: 0 K/UL (ref 0–0.4)
EOSINOPHIL NFR BLD: 0 % (ref 0–7)
ERYTHROCYTE [DISTWIDTH] IN BLOOD BY AUTOMATED COUNT: 15.6 % (ref 11.5–14.5)
HCT VFR BLD AUTO: 36.6 % (ref 35–47)
HGB BLD-MCNC: 11.7 G/DL (ref 11.5–16)
IMM GRANULOCYTES # BLD AUTO: 0.1 K/UL (ref 0–0.04)
IMM GRANULOCYTES NFR BLD AUTO: 1 % (ref 0–0.5)
LYMPHOCYTES # BLD: 1.6 K/UL (ref 0.8–3.5)
LYMPHOCYTES NFR BLD: 13 % (ref 12–49)
MCH RBC QN AUTO: 28.5 PG (ref 26–34)
MCHC RBC AUTO-ENTMCNC: 32 G/DL (ref 30–36.5)
MCV RBC AUTO: 89.1 FL (ref 80–99)
MONOCYTES # BLD: 0.8 K/UL (ref 0–1)
MONOCYTES NFR BLD: 7 % (ref 5–13)
NEUTS SEG # BLD: 9.3 K/UL (ref 1.8–8)
NEUTS SEG NFR BLD: 79 % (ref 32–75)
NRBC # BLD: 0 K/UL (ref 0–0.01)
NRBC BLD-RTO: 0 PER 100 WBC
PLATELET # BLD AUTO: 341 K/UL (ref 150–400)
PMV BLD AUTO: 8.9 FL (ref 8.9–12.9)
RBC # BLD AUTO: 4.11 M/UL (ref 3.8–5.2)
WBC # BLD AUTO: 11.7 K/UL (ref 3.6–11)

## 2019-05-18 PROCEDURE — 4A1H74Z MONITORING OF PRODUCTS OF CONCEPTION, CARDIAC ELECTRICAL ACTIVITY, VIA NATURAL OR ARTIFICIAL OPENING: ICD-10-PCS | Performed by: OBSTETRICS & GYNECOLOGY

## 2019-05-18 PROCEDURE — 75810000275 HC EMERGENCY DEPT VISIT NO LEVEL OF CARE

## 2019-05-18 PROCEDURE — 74011000250 HC RX REV CODE- 250: Performed by: ANESTHESIOLOGY

## 2019-05-18 PROCEDURE — 96375 TX/PRO/DX INJ NEW DRUG ADDON: CPT

## 2019-05-18 PROCEDURE — 96360 HYDRATION IV INFUSION INIT: CPT

## 2019-05-18 PROCEDURE — 75410000002 HC LABOR FEE PER 1 HR: Performed by: OBSTETRICS & GYNECOLOGY

## 2019-05-18 PROCEDURE — 74011250636 HC RX REV CODE- 250/636: Performed by: ANESTHESIOLOGY

## 2019-05-18 PROCEDURE — 77030011943

## 2019-05-18 PROCEDURE — 59025 FETAL NON-STRESS TEST: CPT

## 2019-05-18 PROCEDURE — 65270000029 HC RM PRIVATE

## 2019-05-18 PROCEDURE — 77030014125 HC TY EPDRL BBMI -B: Performed by: ANESTHESIOLOGY

## 2019-05-18 PROCEDURE — 99285 EMERGENCY DEPT VISIT HI MDM: CPT

## 2019-05-18 PROCEDURE — 76060000078 HC EPIDURAL ANESTHESIA: Performed by: ANESTHESIOLOGY

## 2019-05-18 PROCEDURE — 36415 COLL VENOUS BLD VENIPUNCTURE: CPT

## 2019-05-18 PROCEDURE — 74011250636 HC RX REV CODE- 250/636: Performed by: ADVANCED PRACTICE MIDWIFE

## 2019-05-18 PROCEDURE — 74011000250 HC RX REV CODE- 250

## 2019-05-18 PROCEDURE — 96374 THER/PROPH/DIAG INJ IV PUSH: CPT

## 2019-05-18 PROCEDURE — 96361 HYDRATE IV INFUSION ADD-ON: CPT

## 2019-05-18 PROCEDURE — 85025 COMPLETE CBC W/AUTO DIFF WBC: CPT

## 2019-05-18 RX ORDER — SODIUM CHLORIDE 0.9 % (FLUSH) 0.9 %
5-40 SYRINGE (ML) INJECTION AS NEEDED
Status: CANCELLED | OUTPATIENT
Start: 2019-05-18

## 2019-05-18 RX ORDER — LIDOCAINE HYDROCHLORIDE 10 MG/ML
10 INJECTION INFILTRATION; PERINEURAL AS NEEDED
Status: DISCONTINUED | OUTPATIENT
Start: 2019-05-18 | End: 2019-05-19 | Stop reason: HOSPADM

## 2019-05-18 RX ORDER — BUPIVACAINE HYDROCHLORIDE 2.5 MG/ML
INJECTION, SOLUTION EPIDURAL; INFILTRATION; INTRACAUDAL AS NEEDED
Status: DISCONTINUED | OUTPATIENT
Start: 2019-05-18 | End: 2019-05-19 | Stop reason: HOSPADM

## 2019-05-18 RX ORDER — SODIUM CHLORIDE 0.9 % (FLUSH) 0.9 %
5-40 SYRINGE (ML) INJECTION EVERY 8 HOURS
Status: CANCELLED | OUTPATIENT
Start: 2019-05-18

## 2019-05-18 RX ORDER — OXYTOCIN/0.9 % SODIUM CHLORIDE 30/500 ML
500 PLASTIC BAG, INJECTION (ML) INTRAVENOUS ONCE
Status: COMPLETED | OUTPATIENT
Start: 2019-05-18 | End: 2019-05-19

## 2019-05-18 RX ORDER — FENTANYL/BUPIVACAINE/NS/PF 2-1250MCG
1-16 PREFILLED PUMP RESERVOIR EPIDURAL CONTINUOUS
Status: DISCONTINUED | OUTPATIENT
Start: 2019-05-18 | End: 2019-05-19 | Stop reason: HOSPADM

## 2019-05-18 RX ORDER — BUTORPHANOL TARTRATE 2 MG/ML
2 INJECTION INTRAMUSCULAR; INTRAVENOUS ONCE
Status: COMPLETED | OUTPATIENT
Start: 2019-05-18 | End: 2019-05-18

## 2019-05-18 RX ADMIN — BUPIVACAINE HYDROCHLORIDE 10 ML: 2.5 INJECTION, SOLUTION EPIDURAL; INFILTRATION; INTRACAUDAL at 18:18

## 2019-05-18 RX ADMIN — SODIUM CHLORIDE, SODIUM LACTATE, POTASSIUM CHLORIDE, AND CALCIUM CHLORIDE 500 ML: 600; 310; 30; 20 INJECTION, SOLUTION INTRAVENOUS at 18:35

## 2019-05-18 RX ADMIN — SODIUM CHLORIDE, SODIUM LACTATE, POTASSIUM CHLORIDE, AND CALCIUM CHLORIDE 1000 ML: 600; 310; 30; 20 INJECTION, SOLUTION INTRAVENOUS at 17:26

## 2019-05-18 RX ADMIN — SODIUM CHLORIDE, SODIUM LACTATE, POTASSIUM CHLORIDE, AND CALCIUM CHLORIDE 1000 ML: 600; 310; 30; 20 INJECTION, SOLUTION INTRAVENOUS at 07:12

## 2019-05-18 RX ADMIN — PROMETHAZINE HYDROCHLORIDE 12.5 MG: 25 INJECTION INTRAMUSCULAR; INTRAVENOUS at 09:05

## 2019-05-18 RX ADMIN — BUTORPHANOL TARTRATE 2 MG: 2 INJECTION, SOLUTION INTRAMUSCULAR; INTRAVENOUS at 09:05

## 2019-05-18 RX ADMIN — Medication 10 ML/HR: at 18:38

## 2019-05-18 RX ADMIN — SODIUM CHLORIDE, SODIUM LACTATE, POTASSIUM CHLORIDE, AND CALCIUM CHLORIDE 1000 ML: 600; 310; 30; 20 INJECTION, SOLUTION INTRAVENOUS at 23:09

## 2019-05-18 NOTE — PROGRESS NOTES
1500 Patient arrived on the unit with complaints of contractions. Will monitor. CNM aware of arrival. 
 
[address] [place], [place] and Talha Ga at the bedside assessing the patient and getting consent for delivery. 1512 SVE 4/100/-1 by Jenise Moreno RN received orders to admit patient, place IV and send lab.  
 
1523 20ga IV placed in right hand, flushed and patient tolerated well. 1740 Patient on the labor ball. EFM on. CNM at the bedside. 1800 Dr Dawn Dance at the bedside assessing patient and getting consent for epidural. Patient agree and is placed on proper position. 0 Timeout done with MD Patient and RN. All agree. Epidural placed, see MD notes. After placement patient was assisted to the supine position with right hip tilt. BP cuff on and monitoring. [id number] [facility], CNM at the bedside. SVE 7/100/-1, SROM, clear fluid. No new orders at this this. Will continue to monitor. 1900 Bedside and Verbal shift change report given to Tatiana Moreno RN (oncoming nurse) by Mirtha Ibrahim RN (offgoing nurse). Report included the following information SBAR, Kardex and MAR.

## 2019-05-18 NOTE — PROGRESS NOTES
0710 report recv'd from Sorin Cazares RN. IV fluid bolus started in left ac. Labs drawn with IV start. Will continue to monitor. [address] Cathaleen Cancel CNM updated on sve. Orders received for therapeutic rest. 2mg stadol and 12.5mg phnergan x1 dose. Will recheck cervix in 2 hours. 1053 sve rechecked 3/100/-1 CNM paged. 1125 spoke with Leon Ken CNM. Orders received for discharge once patient is awake. 1152 pt informed of d/c orders and encouraged to call her provider for further instructions due to not being in active labor. Pt informed to come back here if ctxs worsens and she doesn't think she can make it to her provider. Pt verbalized understanding. 1210 pt ambulated off unit with steady gait with family. Pt was encouraged to head to her doctor instead of going home. Pt placed call to her provider prior to leaving unit. D/c instructions given and pt verbalized understanding. Pt informed to return to hospital if need be.

## 2019-05-18 NOTE — ANESTHESIA PREPROCEDURE EVALUATION
Relevant Problems No relevant active problems Anesthetic History No history of anesthetic complications Review of Systems / Medical History Patient summary reviewed and pertinent labs reviewed Pulmonary Within defined limits Neuro/Psych Within defined limits Cardiovascular Within defined limits Exercise tolerance: >4 METS 
  
GI/Hepatic/Renal 
Within defined limits Endo/Other Within defined limits Other Findings Physical Exam 
 
Airway Mallampati: III 
TM Distance: 4 - 6 cm Neck ROM: normal range of motion Mouth opening: Normal 
 
 Cardiovascular Rhythm: regular Rate: normal 
 
 
 
 Dental 
 
Dentition: Upper dentition intact and Lower dentition intact Pulmonary Breath sounds clear to auscultation Abdominal 
 
 
 
 Other Findings Anesthetic Plan ASA: 2 Anesthesia type: epidural 
 
 
 
 
 
Anesthetic plan and risks discussed with: Patient

## 2019-05-18 NOTE — PROGRESS NOTES
[facility] Labor Progress Note Patient: Ganesh Mina MRN: [id number]  SSN: [id number] YOB: 1990  Age: 33 y.o. Sex: female Subjective:  
Patient coping well with contractions with epidural in place. Called to bedside by Rose Perez RN because pt stated that she was feeling pressure. Objective:  
 
Patient Vitals for the past 4 hrs: 
 Temp Pulse BP SpO2  
19 1843 98.5 °F (36.9 °C)     
19 1829  94 121/70   
19 1826  93 121/68   
19 1823  97 132/65 100 % 19 1820  94 128/74   
19 1817  94 131/67   
19 1813    100 % 19 1808    100 % 19 1801  100 117/66  Fetal heart baseline 140 , mod variability, + accelerations, early and variable decelerations. Uterine contractions q 5-6 minutes, lasting  sec, mod to palpation, resting tone soft,  
Sterile Vaginal Exam: 7 cm dilated/ 100 % effaced/ -1 station, membranes SROM @ 5641, clear Assessment:  Intrauterine pregnancy at term Category 2 fetal heart rate tracing Labor Plan:  
Continue current orders/management CNM management Anticipate  Pearl Juares CNM

## 2019-05-18 NOTE — H&P
History & Physical 
 
Name: Sally Morris MRN: [id number]  SSN: [id number] YOB: 1990  Age: 33 y.o. Sex: female Subjective:  
Jacqueline Oneill is a 34 yo  at 40 weeks who presents to L&D today with contractions every 5-9 minutes. She states that the contractions \"hurt too much\" and that cannot take it anymore. She was seen at [facility] this morning where she changed from 2-3 cm. They discharged her and instructed her to come here to her own provider. She called [facility] unit and stated that she was coming to the hospital for an epidural and induction. Her cervix was 3/100/-1 on discharge. Pt states that she has had a lot of mucousy discharge over the past few days, but is negative for ROM. Reports positive FM, negative VB. States that she feels pressure with her contractions. Pt takes iron for anemia in addition to potassium and PNV. Estimated Date of Delivery: 19 OB History  Para Term  AB Living 2       1 SAB TAB Ectopic Molar Multiple Live Births # Outcome Date GA Lbr Negro/2nd Weight Sex Delivery Anes PTL Lv  
2 Current 1 AB           
 
 
Ms. Caroline Henry is admitted with pregnancy at 40w0d for active labor. Prenatal course was complicated by placenta previa. Please see prenatal records for details. Past Medical History:  
Diagnosis Date  Anemia affecting pregnancy in third trimester 3/18/2019 Past Surgical History:  
Procedure Laterality Date  HX WISDOM TEETH EXTRACTION Social History Occupational History  Not on file Tobacco Use  Smoking status: Never Smoker  Smokeless tobacco: Never Used Substance and Sexual Activity  Alcohol use: No  
  Frequency: Never  Drug use: No  
 Sexual activity: Yes  
  Partners: Male Birth control/protection: None Family History Problem Relation Age of Onset  Breast Cancer Mother No Known Allergies Prior to Admission medications Medication Sig Start Date End Date Taking? Authorizing Provider  
potassium 99 mg tablet Take 99 mg by mouth daily. Provider, Historical  
ferrous sulfate (IRON) 325 mg (65 mg iron) tablet Take  by mouth two (2) times a day. Provider, Historical  
PNV XY.71/JLFVIRL fum/folic ac (PRENATAL PO) Take  by mouth. Provider, Historical  
  
 
Review of Systems: A comprehensive review of systems was negative except for: Musculoskeletal: positive for 2+ bilateral pitting edema Objective:  
 
Vitals: 
Vitals:  
 05/18/19 1508 BP: 143/73 Pulse: (!) 113 Resp: 16 Temp: 98.2 °F (36.8 °C) SpO2: 97% Physical Exam: 
Patient without distress. Breast: deferred Heart: Regular rate and rhythm, S1S2 present or without murmur or extra heart sounds Lung: clear to auscultation throughout lung fields, no wheezes, no rales, no rhonchi and normal respiratory effort Abdomen: soft, nontender, normal bowel sounds Fundus: soft and non tender Cervical Exam: 4 cm dilated  /100% effaced  /-1 station Presenting Part: cephalic Lower Extremities:  - Edema 2+ 
 - No evidence of DVT seen on physical exam. 
Negative Brittany's sign. No cords or calf tenderness. 
 - Patellar Reflexes: 2+ bilaterally - Clonus: absent Membranes:  Intact Fetal Heart Rate: Reactive Baseline: 150 per minute Variability: moderate Accelerations: yes Decelerations: none Uterine contractions: regular, every 8-9 minutes, mod to palp, soft between Prenatal Labs:  
Lab Results Component Value Date/Time  
 Rubella, External negative 10/25/2018 GrBStrep, External NEGATIVE 04/24/2019 HBsAg, External negative 10/25/2018 HIV, External non reactive 10/25/2018 Gonorrhea, External negative 10/25/2018 Chlamydia, External negative 10/25/2018 ABO,Rh O+ 10/25/2018 Assessment/Plan:  
Early labor at term GBS negative Anemia Active Problems: Encounter for supervision of normal pregnancy Overview:  Collaborating MD 
    Centering Group 7 JUAN PABLO by: Irma 71 screening - declined Social:  Lives by herself, FOB is involved somewhat NOB labs normal 
    20 wk anatomy - placenta previa, resolved by 29 weeks Gtt WNL Labor support: FOB, Pam Duffy, and mother Postpartum support:  Going to parents home for 30 days Tdap 19    Flu received Labor Plans:  FOB and mother for support Medical risk factors: none  issues of concern: previa, resolved PP support: parents, family Family planning:  Nexplanon Anemia affecting pregnancy in third trimester (3/18/2019) Overview: Floradix BID initiated Plan: Admit for Reassuring fetal status in early labor (pt does not want to be discharged) Continue expectant management, encourage ambulation Continue plan for vaginal delivery. Group B Strep was negative.

## 2019-05-18 NOTE — ANESTHESIA PROCEDURE NOTES
Epidural Block    Start time: 5/18/2019 6:09 PM  End time: 5/18/2019 6:18 PM  Performed by: Maddie Gruber MD  Authorized by: Maddie Gruber MD     Pre-Procedure  Indication: labor epidural    Preanesthetic Checklist: patient identified, risks and benefits discussed, anesthesia consent, timeout performed and anesthesia consent    Timeout Time: 18:09        Epidural:   Patient position:  Seated  Prep region:  Lumbar  Prep: Betadine    Location:  L3-4    Needle and Epidural Catheter:   Needle Type:  Tuohy  Needle Gauge:  17 G  Injection Technique:  Loss of resistance using air  Attempts:  1  Catheter Size:  18 G  Catheter at Skin Depth (cm):  8  Depth in Epidural Space (cm):  4  Events: no paresthesia and negative aspiration test    Test Dose:  Lidocaine 1.5% w/ epi and negative    Assessment:   Catheter Secured:  Tegaderm and tape  Insertion:  Uncomplicated  Patient tolerance:  Patient tolerated the procedure well with no immediate complications

## 2019-05-18 NOTE — PROGRESS NOTES
[facility] Labor Progress Note Patient: Dai Umana MRN: [id number]  SSN: [id number] YOB: 1990  Age: 33 y.o. Sex: female Subjective:  
Patient coping well with contractions s/p epidural placement, felt like water broke with when sitting with epidural placement. Objective:  
 
Patient Vitals for the past 4 hrs: 
 Temp Pulse Resp BP SpO2  
19 1829  94  121/70   
19 1828     100 % 19 1826  93  121/68   
19 1823  97  132/65 100 % 19 1820  94  128/74   
19 1818     100 % 19 1817  94  131/67   
19 1816     (!) 88 % 19 1813     100 % 19 1808     100 % 19 1801  100  117/66   
19 1508 98.2 °F (36.8 °C) (!) 113 16 143/73 97 % Fetal heart baseline 150s , mod variability, pos accelerations, neg decelerations. Uterine contractions q 5-6 minutes, strong to palpation, resting tone soft,  
Sterile Vaginal Exam: 7 cm dilated/ 100 % effaced/ -2 station, cervix position anterior , fetal presentation vertex, membranes SROM after exam.  
 
 
 
Assessment:  
Intrauterine pregnancy at term Category 1 fetal heart rate tracing Active Labor Plan:  
Continue current orders/management CNM management Anticipate  Angelica Hastings CNM

## 2019-05-18 NOTE — PROGRESS NOTES
1940 Call to [facility] regarding pt report of feeling pressure in her bottom. Yonatan Krause states she will come out and see the patient. [address] and Kanu KING at the bedside for sve. 
 
0700 Bedside and Verbal shift change report given to SARTHAK Kimble RN by Anjelica Kearns. Report included the following information SBAR, Procedure Summary, Intake/Output and MAR.

## 2019-05-18 NOTE — DISCHARGE INSTRUCTIONS
Patient Education   Patient Education   Patient Education   Call your provider for further instructions. Keep next appointment if not delivered by then. Counting Your Baby's Kicks: Care Instructions  Your Care Instructions    Counting your baby's kicks is one way your doctor can tell that your baby is healthy. Most women--especially in a first pregnancy--feel their baby move for the first time between 16 and 22 weeks. The movement may feel like flutters rather than kicks. Your baby may move more at certain times of the day. When you are active, you may notice less kicking than when you are resting. At your prenatal visits, your doctor will ask whether the baby is active. In your last trimester, your doctor may ask you to count the number of times you feel your baby move. Follow-up care is a key part of your treatment and safety. Be sure to make and go to all appointments, and call your doctor if you are having problems. It's also a good idea to know your test results and keep a list of the medicines you take. How do you count fetal kicks? · A common method of checking your baby's movement is to count the number of kicks or moves you feel in 1 hour. Ten movements (such as kicks, flutters, or rolls) in 1 hour are normal. Some doctors suggest that you count in the morning until you get to 10 movements. Then you can quit for that day and start again the next day. · Pick your baby's most active time of day to count. This may be any time from morning to evening. · If you do not feel 10 movements in an hour, your baby may be sleeping. Wait for the next hour and count again. When should you call for help? Call your doctor now or seek immediate medical care if:    · You noticed that your baby has stopped moving or is moving much less than normal.    Watch closely for changes in your health, and be sure to contact your doctor if you have any problems. Where can you learn more?   Go to http://lalo-humberto.info/. Enter J830 in the search box to learn more about \"Counting Your Baby's Kicks: Care Instructions. \"  Current as of: September 5, 2018  Content Version: 11.9  © 5601-5591 [facility]. Care instructions adapted under license by [employer] (which disclaims liability or warranty for this information). If you have questions about a medical condition or this instruction, always ask your healthcare professional. [address] any warranty or liability for your use of this information. Pain Relief During Labor: Care Instructions  Your Care Instructions    You can choose from a few types of pain relief for childbirth. These include:  · Medicine. Your doctor may offer different types of pain medicine while you are in labor. · Breathing techniques. · Comfort measures. This is often called natural childbirth. You also can use a combination of these choices. You can write down your choices for pain relief in a birth plan. A birth plan is a list of what you want during labor. Your personal needs are important when you make this choice. The right choice is the one that feels right to you. Every labor is different. Some women go into labor planning to have natural childbirth and later find that they need pain relief. Plan for what you want. But be aware that you may change your mind during labor. Follow-up care is a key part of your treatment and safety. Be sure to make and go to all appointments, and call your doctor if you are having problems. It's also a good idea to know your test results and keep a list of the medicines you take. What medicines can you use for pain relief? If you decide to take medicine to help your pain during labor, here are some medicines that may be used. Local anesthesia. You get a shot of medicine to numb the area if your doctor needs to make a cut to widen the vaginal opening.   Regional anesthesia. A doctor can inject medicine into a space around the spinal cord. This is called an epidural. It can be used during labor to numb your lower body. But lack of feeling sometimes makes it harder to push. A spinal block is an injection of pain medicine into the spinal fluid. It quickly and fully numbs the pelvic area. In some cases, a doctor may combine a spinal block with an epidural.  Opioids. These are pain relievers given through a vein or as a shot in the muscle. They include nalbuphine (Nubain), butorphanol (Stadol), and fentanyl. They can ease anxiety and pain. But they don't stop pain completely. Usually they aren't used right before delivery because they can slow the baby's breathing. What comfort measures can you use for pain relief? · Breathing techniques: Breathing in a rhythm can distract you from pain. Childbirth classes can teach you how to do focused breathing. · Distraction: You can walk, play cards, listen to music, watch TV, take a shower, or read. These can help take your mind off your contractions. · Massage: Your birth partner can massage your shoulders and lower back during contractions. Strong massage of the back muscles during contractions may reduce back labor pain. · Imagery: You can imagine a peaceful place. For instance, you can think of contractions as waves rolling over you. When should you call for help? Watch closely for changes in your health, and be sure to contact your doctor if:    · You want to learn more about pain relief. Where can you learn more? Go to http://lalo-humberto.info/. Enter Delton Curling in the search box to learn more about \"Pain Relief During Labor: Care Instructions. \"  Current as of: September 5, 2018  Content Version: 11.9  © 6873-1775 [employer]. Care instructions adapted under license by [employer] (which disclaims liability or warranty for this information).  If you have questions about a medical condition or this instruction, always ask your healthcare professional. [place] [address] any warranty or liability for your use of this information. Week 40 of Your Pregnancy: Care Instructions  Your Care Instructions    By week 40, you have reached your due date. Your baby could be coming any day. But it's a good idea to think ahead to the next few weeks and what might happen. If this is your first time having a baby, try not to worry. If you don't start labor on your own by 41 or 42 weeks, your doctor may recommend giving you medicines to start labor. This care sheet gives you information about how labor can be started. It also gives you some ideas about breathing exercises you can do if you start to feel anxious or if you are trying to relax. Follow-up care is a key part of your treatment and safety. Be sure to make and go to all appointments, and call your doctor if you are having problems. It's also a good idea to know your test results and keep a list of the medicines you take. How can you care for yourself at home? Learn how labor can be started  · If you and your baby are both healthy and ready, and if your cervix has started to open, your doctor may \"break your water\" (rupture the amniotic sac). This often starts labor. · If your cervix is not quite ready, you may get a medicine called Pitocin through an IV to start contractions. · If your cervix is still very firm, you may have prostaglandin tablets (misoprostol) placed in your vagina to soften the cervix. Try guided imagery to help you relax  · Find a comfortable place to sit or lie down. Close your eyes. · Start by just taking a few deep breaths to help you relax. · Picture a setting that is calm and peaceful. This could be a beach, a mountain setting, a meadow, or a scene that you choose. · Imagine your scene, and try to add some detail. For example, is there a breeze? What does the yanet look like?  Is it clear, or are there clouds? · It often helps to add a path to your scene. For example, as you enter the meadow, imagine a path leading you through the meadow to the trees on the other side. As you follow the path farther into the [facility] SITE you feel more and more relaxed. · When you are deep into your scene and are feeling relaxed, take a few minutes to breathe slowly and feel the calm. · When you are ready, slowly take yourself out of the scene back to the present. Tell yourself that you will feel relaxed and refreshed and will bring that sense of calm with you. · Count to 3, and open your eyes. Where can you learn more? Go to http://lalo-humberto.info/. Enter E853 in the search box to learn more about \"Week 40 of Your Pregnancy: Care Instructions. \"  Current as of: September 5, 2018  Content Version: 11.9  © 7659-4518 [employer]. Care instructions adapted under license by [employer] (which disclaims liability or warranty for this information). If you have questions about a medical condition or this instruction, always ask your healthcare professional. [place] [address] any warranty or liability for your use of this information.

## 2019-05-18 NOTE — PROGRESS NOTES
7670: pt arrived to unit via wheelchair with C/O contractions and LOF. Denies vaginal bleeding. Pt is 40 wks gestation, . Confirmed name, , and allergies. 3255: pt disconnected from EFM/toco and encouraged to ambulate in the halls. Socks, gown, pads, and underwear provided to the pt.  
 
0653: Justin CAMEJOM called and aware of SVE, intermittent variable decels, and negative Nitrazine.  Gave orders for 1000 ml fluid bolus of LR, monitor and assess after  
 
0700: Report given to [employer] RN

## 2019-05-18 NOTE — PROGRESS NOTES
[facility] Labor Progress Note Patient: Julianne Diego MRN: [id number]  SSN: [id number] YOB: 1990  Age: 33 y.o. Sex: female Subjective:  
Patient coping well with contractions. Inquiring about epidural timing. Has been walking the halls and sitting on birthing ball. Request cervical exam to determine if she is going to get an epidural. 
 
 
Objective:  
Blood pressure 143/73, pulse (!) 113, temperature 98.2 °F (36.8 °C), resp. rate 16, last menstrual period 2018, SpO2 97 %.  bpm with intermittent auscultation. Uterine contractions q8 minutes, moderate to palpation, resting tone soft Sterile Vaginal Exam: 5-6 cm dilated/ 100% effaced/ -1 station, membranes intact Assessment:  
 
40w0d Reassuring FHT's Labor Plan:  
Continue current orders/management CNM management Epidural placement Anticipate  Isaias Jalloh CNM

## 2019-05-19 PROCEDURE — 65270000029 HC RM PRIVATE

## 2019-05-19 PROCEDURE — 74011250637 HC RX REV CODE- 250/637: Performed by: MIDWIFE

## 2019-05-19 PROCEDURE — 75410000002 HC LABOR FEE PER 1 HR: Performed by: OBSTETRICS & GYNECOLOGY

## 2019-05-19 PROCEDURE — 75410000000 HC DELIVERY VAGINAL/SINGLE: Performed by: OBSTETRICS & GYNECOLOGY

## 2019-05-19 PROCEDURE — 77010026064 HC OXYGEN INFANT MED AIR MIN: Performed by: OBSTETRICS & GYNECOLOGY

## 2019-05-19 PROCEDURE — 77030011943

## 2019-05-19 PROCEDURE — 75410000003 HC RECOV DEL/VAG/CSECN EA 0.5 HR: Performed by: OBSTETRICS & GYNECOLOGY

## 2019-05-19 PROCEDURE — 74011250636 HC RX REV CODE- 250/636: Performed by: MIDWIFE

## 2019-05-19 RX ORDER — OXYTOCIN/RINGER'S LACTATE 20/1000 ML
125-500 PLASTIC BAG, INJECTION (ML) INTRAVENOUS ONCE
Status: ACTIVE | OUTPATIENT
Start: 2019-05-19 | End: 2019-05-19

## 2019-05-19 RX ORDER — HYDROCODONE BITARTRATE AND ACETAMINOPHEN 5; 325 MG/1; MG/1
1 TABLET ORAL
Status: DISCONTINUED | OUTPATIENT
Start: 2019-05-19 | End: 2019-05-21 | Stop reason: HOSPADM

## 2019-05-19 RX ORDER — ACETAMINOPHEN 325 MG/1
650 TABLET ORAL
Status: DISCONTINUED | OUTPATIENT
Start: 2019-05-19 | End: 2019-05-21 | Stop reason: HOSPADM

## 2019-05-19 RX ORDER — SWAB
1 SWAB, NON-MEDICATED MISCELLANEOUS DAILY
Status: DISCONTINUED | OUTPATIENT
Start: 2019-05-19 | End: 2019-05-21 | Stop reason: HOSPADM

## 2019-05-19 RX ORDER — IBUPROFEN 800 MG/1
800 TABLET ORAL EVERY 8 HOURS
Status: DISCONTINUED | OUTPATIENT
Start: 2019-05-19 | End: 2019-05-21 | Stop reason: HOSPADM

## 2019-05-19 RX ORDER — NALOXONE HYDROCHLORIDE 0.4 MG/ML
0.4 INJECTION, SOLUTION INTRAMUSCULAR; INTRAVENOUS; SUBCUTANEOUS AS NEEDED
Status: DISCONTINUED | OUTPATIENT
Start: 2019-05-19 | End: 2019-05-21 | Stop reason: HOSPADM

## 2019-05-19 RX ORDER — HYDROCORTISONE ACETATE PRAMOXINE HCL 2.5; 1 G/100G; G/100G
CREAM TOPICAL AS NEEDED
Status: DISCONTINUED | OUTPATIENT
Start: 2019-05-19 | End: 2019-05-21 | Stop reason: HOSPADM

## 2019-05-19 RX ADMIN — IBUPROFEN 800 MG: 800 TABLET ORAL at 19:33

## 2019-05-19 RX ADMIN — OXYTOCIN-SODIUM CHLORIDE 0.9% IV SOLN 30 UNIT/500ML 30000 MILLI-UNITS/HR: 30-0.9/5 SOLUTION at 01:20

## 2019-05-19 RX ADMIN — Medication 1 TABLET: at 11:34

## 2019-05-19 RX ADMIN — IBUPROFEN 800 MG: 800 TABLET ORAL at 11:34

## 2019-05-19 RX ADMIN — IBUPROFEN 800 MG: 800 TABLET ORAL at 02:00

## 2019-05-19 NOTE — PROGRESS NOTES
[id number]:  Pt set up with breast pump and supplies, instructions provided, initiation of pump demonstrated and verbalization of understanding by patient

## 2019-05-19 NOTE — PROGRESS NOTES
0700 Received report from Natalya Butcher RN. Patient is resting in the bed.  
 
1450 TRANSFER - OUT REPORT: 
 
Verbal report given to Bosie Brunner, RN(name) on Matilde Lock  being transferred to MIU(unit) for routine progression of care Report consisted of patients Situation, Background, Assessment and  
Recommendations(SBAR). Information from the following report(s) SBAR, Kardex and MAR was reviewed with the receiving nurse. Lines:  
Peripheral IV 05/18/19 Right Hand (Active) Site Assessment Clean, dry, & intact 5/18/2019  7:15 PM  
Phlebitis Assessment 0 5/18/2019  7:15 PM  
Infiltration Assessment 0 5/18/2019  7:15 PM  
Dressing Status Clean, dry, & intact 5/18/2019  7:15 PM  
Dressing Type Tape;Transparent 5/18/2019  7:15 PM  
Hub Color/Line Status Pink 5/18/2019  7:15 PM  
Alcohol Cap Used No 5/18/2019  7:15 PM  
  
 
Opportunity for questions and clarification was provided. Patient transported with: 
 Registered Nurse

## 2019-05-19 NOTE — PROGRESS NOTES
[facility] Labor Progress Note Patient: Chula Valente MRN: [id number]  SSN: [id number] YOB: 1990  Age: 33 y.o. Sex: female Subjective:  
Patient coping well with contractions. Epidural working well. States that she feels pressure with contractions. Objective:  
 
Patient Vitals for the past 4 hrs: 
 Temp Pulse Resp BP SpO2  
05/18/19 2223 98.5 °F (36.9 °C)  16    
05/18/19 2204  93  127/65   
05/18/19 2201     92 % 05/18/19 2200     100 % 05/18/19 2156     92 % 05/18/19 2155     99 % 05/18/19 2150     100 % 05/18/19 2149     (!) 88 % 05/18/19 2148  91  121/71   
05/18/19 2139     100 % 05/18/19 2134  88   92 % 05/18/19 2133    133/62   
05/18/19 2126     90 % 05/18/19 2125     97 % 05/18/19 2122 97.9 °F (36.6 °C)  16    
05/18/19 2120     100 % 05/18/19 2118  93  120/70   
05/18/19 2115     100 % 05/18/19 2110     100 % 05/18/19 2104  90  123/67 (!) 78 % 05/18/19 2103     100 % 05/18/19 2058     100 % 05/18/19 2051     91 % 05/18/19 2049  86  118/71   
05/18/19 2041     93 % 05/18/19 2039     100 % 05/18/19 2035 99 °F (37.2 °C) 86 18 119/78   
05/18/19 2018  96  103/55   
05/18/19 2014     100 % 05/18/19 2009     100 % 05/18/19 2005  88  103/61   
05/18/19 1949  92  109/55 100 % 05/18/19 1946     94 % 05/18/19 1944     100 % 05/18/19 1938     92 % 05/18/19 1934  94  112/62   
05/18/19 1919  91  105/55   
05/18/19 1905 97.9 °F (36.6 °C) 88 16 121/62 (!) 89 % 05/18/19 1903     99 % 05/18/19 1858     100 % 05/18/19 1853     (!) 88 % 05/18/19 1851  85  125/68   
05/18/19 1843 98.5 °F (36.9 °C)    100 % 05/18/19 1834  93  130/62  Fetal heart [id number] , mod variability, + accelerations, early and variable decelerations. Uterine contractions q 2-4 minutes, strong to palpation, resting tone soft,  
Sterile Vaginal Exam: 9.5 cm dilated/ 100 % effaced/ 0 station Assessment:  @ 40w0d Category 2 fetal heart rate tracing Term spontaneous labor Plan:  
Continue current orders/management CNM management Position changes to facilitate complete dilation Anticipate  Isabella Rosen CNM

## 2019-05-19 NOTE — L&D DELIVERY NOTE
Delivery Summary    Patient: Lilo Daily MRN: [id number]  SSN: [id number]    YOB: 1990  Age: 33 y.o. Sex: female       Pt became completely dilated at [address] when CNM was called to bedside. She began pushing at 0029 and pushed effectively to  of live female  at 5 in LIZET position. Baby found to be non vigorous at birth and placed on maternal abdomen for drying and stabilization with apgars of 4 and 9. Infant taken to warmer after 1 min of delayed cord clamping due to  acuity. Placenta delivered at [facility] with 3 vessels, normal in appearance upon examination. Fundus firm with massage and pitocin via PIV. . Perineum inspected and intact,  and superficial vaginal abrasion noted and right periurethral lac both without repair. . Mother and  stable in LDR.  weight is pending as baby is skin to skin. Delivery attended and supervised by Angel Blanchard CNM.         Information for the patient's :  Maria Dolores Middleton [[id number]]       Labor Events:    Labor: No    Steroids: None   Cervical Ripening Date/Time:       Cervical Ripening Type: None   Antibiotics During Labor: No   Rupture Identifier:      Rupture Date/Time: 2019 6:19 PM   Rupture Type: SROM   Amniotic Fluid Volume:      Amniotic Fluid Description: Clear    Amniotic Fluid Odor: None    Induction: None       Induction Date/Time:        Indications for Induction:      Augmentation: None   Augmentation Date/Time:      Indications for Augmentation:     Labor complications: None       Additional complications:        Delivery Events:  Indications For Episiotomy:     Episiotomy: None   Perineal Laceration(s):    Repaired:    Periurethral Laceration Location: left    Repaired: No    Labial Laceration Location:     Repaired:     Sulcal Laceration Location:     Repaired:     Vaginal Laceration Location:     Repaired:     Cervical Laceration Location:     Repaired:     Repair Suture: None   Number of Repair Packets:  0   Estimated Blood Loss (ml):  ml     Delivery Date: 2019    Delivery Time: 1:02 AM  Delivery Type: Vaginal, Spontaneous  Sex:  Female    Gestational Age: 44w3d   Delivery Clinician:  Bob Dickey  Living Status: Living   Delivery Location: [facility] [address]          APGARS  One minute Five minutes Ten minutes   Skin color:   0   1      Heart rate:   2   2      Grimace:   0   2      Muscle tone:   1   2      Breathin   2      Totals:   4   9          Presentation: Vertex    Position: Right Occiput Anterior  Resuscitation Method:  Suctioning-bulb; Tactile Stimulation     Meconium Stained: None      Cord Information: 3 Vessels  Complications: None  Cord around:    Delayed cord clamping? Yes  Cord clamped date/time:   Disposition of Cord Blood: Lab    Blood Gases Sent?: No    Placenta:  Date/Time: 2019  1:06 AM  Removal: Spontaneous      Appearance: Intact      Measurements:  Birth Weight:        Birth Length:        Head Circumference:        Chest Circumference:       Abdominal Girth: Other Providers:   Romana Pimple W;SWETHA PEDROZA;GEOFFREY MADRID;SWETHA AVITIA;YOLANDE GIBSON;Francesca SORENSEN, Primary Nurse;Primary Nelson Nurse;Midwife;Midwife;Staff Nurse;Charge Nurse           Group B Strep:   Lab Results   Component Value Date/Time    GrBStrep, External NEGATIVE 2019     Information for the patient's :  Destiney Swartz [[id number]]   No results found for: ABORH, PCTABR, PCTDIG, BILI, ABORHEXT, ABORH    No results for input(s): PCO2CB, PO2CB, HCO3I, SO2I, IBD, PTEMPI, SPECTI, PHICB, ISITE, IDEV, IALLEN in the last 72 hours.

## 2019-05-19 NOTE — PROGRESS NOTES
Bedside and Verbal shift change report given to Miguel Hancock (oncoming nurse) by J. Daphney Aase (offgoing nurse). Report given with SBAR, Kardex, Intake/Output and MAR.

## 2019-05-19 NOTE — PROGRESS NOTES
0530: RN at bedside to assist pt to BR at this time. Pt successfully ambulates to the BR with the assistance of the RN. Pt successfully voids approx 650mL of clear yellow urine. Pericare performed using nicholas bottle, ice pad. Pt ambulates back to bed with assistance of the RN. Fundal exam performed. Pt educated regarding emptying bladder q2hr. Pt advised to call RN if she needs to use the BR. Pt verbalizes understanding and agrees. Pt denies need of anything else at this time. Pt left resting comfortably in bed with  in bassinet at the bedside.

## 2019-05-20 PROCEDURE — 74011250637 HC RX REV CODE- 250/637: Performed by: ADVANCED PRACTICE MIDWIFE

## 2019-05-20 PROCEDURE — 74011250637 HC RX REV CODE- 250/637: Performed by: MIDWIFE

## 2019-05-20 PROCEDURE — 65270000029 HC RM PRIVATE

## 2019-05-20 RX ADMIN — IBUPROFEN 800 MG: 800 TABLET ORAL at 11:24

## 2019-05-20 RX ADMIN — MUPIROCIN: 20 OINTMENT TOPICAL at 19:29

## 2019-05-20 RX ADMIN — IBUPROFEN 800 MG: 800 TABLET ORAL at 19:29

## 2019-05-20 RX ADMIN — IBUPROFEN 800 MG: 800 TABLET ORAL at 02:42

## 2019-05-20 RX ADMIN — Medication 1 TABLET: at 11:24

## 2019-05-20 NOTE — ROUTINE PROCESS
Bedside and Verbal shift change report given to Yuri Berger RN (oncoming nurse) by Yi Graff RN (offgoing nurse). Report given with SBAR, Kardex, Intake/Output and MAR.

## 2019-05-20 NOTE — PROGRESS NOTES
CNMPostpartumNoteDay1 S: Patient ambulating and voiding without difficulty. Patient happy with birth experience. Breastfeeding well with good latch, but baby with no voids post birth. No complaints. O: Vital signs stable, Heart RRR without murmur, Lungs CTA bilaterally, FF below umbilicus, lochia rubra light, breasts soft, nipples intact, +2 edema, negative s/s DVT. FOB not in to visit yet. Reviewed PP depression s/s A: Postpartum Day 1 Breastfeeding well Blood type O post/Rubella immune/GBS neg 
  
P: Continue current postpartum orders Lactation consult today Anticipate discharge tomorrow Got Tdap this pregnancy

## 2019-05-21 VITALS
SYSTOLIC BLOOD PRESSURE: 121 MMHG | RESPIRATION RATE: 16 BRPM | OXYGEN SATURATION: 92 % | DIASTOLIC BLOOD PRESSURE: 67 MMHG | TEMPERATURE: 97.9 F | HEART RATE: 78 BPM

## 2019-05-21 PROBLEM — Z34.92 ENCOUNTER FOR SUPERVISION OF NORMAL PREGNANCY IN SECOND TRIMESTER: Status: RESOLVED | Noted: 2019-01-02 | Resolved: 2019-05-21

## 2019-05-21 PROBLEM — O99.013 ANEMIA AFFECTING PREGNANCY IN THIRD TRIMESTER: Status: RESOLVED | Noted: 2019-03-18 | Resolved: 2019-05-21

## 2019-05-21 PROCEDURE — 74011250637 HC RX REV CODE- 250/637: Performed by: MIDWIFE

## 2019-05-21 RX ADMIN — IBUPROFEN 800 MG: 800 TABLET ORAL at 03:53

## 2019-05-21 NOTE — ROUTINE PROCESS
Bedside and Verbal shift change report given to Ajager [address] (oncoming nurse) by SARTHAK Casillas RN (offgoing nurse). Report included the following information SBAR, Kardex, Procedure Summary, Intake/Output, MAR and Recent Results.

## 2019-05-21 NOTE — PROGRESS NOTES
Bedside and Verbal shift change report given to Jim Dubose (oncoming nurse) by Pop Roldan. Shantell Davila (offgoing nurse). Report given with SBAR, Kardex, Intake/Output and MAR.

## 2019-05-21 NOTE — LACTATION NOTE
This note was copied from a baby's chart. Mother and baby for discharge today. Mother states she is breastfeeding but then offering baby formula - she plans to wean formula after her breast milk comes in. Reviewed breastfeeding basics:  Supply and demand,  stomach size, early  Feeding cues, skin to skin, positioning and baby led latch-on, assymetrical latch with signs of good, deep latch vs shallow, feeding frequency and duration, and log sheet for tracking infant feedings and output. Breastfeeding Booklet and Warm line information given. Discussed typical  weight loss and the importance of infant weight checks with pediatrician 1-2 post discharge. Baby has a pediatric appointment tomorrow. Engorgement Care Guidelines:  Reviewed how milk is made and normal phases of milk production. Taught care of engorged breasts - frequent breastfeeding encouraged, cool packs and motrin as tolerated. Anticipatory guidance shared. Pt chooses to do both breast and bottle. Discussed effects of early supplementation on breastfeeding success; may decrease breastmilk production and supply, increase risk for pathological engorgement, baby may develop preference for faster flow from bottles vs breast, and baby's stomach can be stretched if larger volumes of formula are given. Care for sore/tender nipples discussed:  ways to improve positioning and latch practiced and discussed, hand express colostrum after feedings and let air dry, light application of lanolin, hydrogel pads, seek comfortable laid back feeding position, start feedings on least sore side first.    Dicussed pumping/storage and preparation of expressed breast milk. Discussed eating a healthy diet. Instructed mother to eat a variety of foods in order to get a well balanced diet.  She should consume an extra 500 calories per day (more than her non-pregnant requirement.) These extra calories will help provide energy needed for optimal breast milk production. Mother also encouraged to \"drink to thirst\" and it is recommended that she drink fluids such as water, fruit/vegetable juice. Nutritious snacks should be available so that she can eat throughout the day to help satisfy her hunger and maintain a good milk supply. Mother given LC# and breast feeding support group info. Pt will successfully establish breastfeeding by feeding in response to early feeding cues   or wake every 3h, will obtain deep latch, and will keep log of feedings/output. Taught to BF at hunger cues and or q 2-3 hrs and to offer 10-20 drops of hand expressed colostrum at any non-feeds. Breast Assessment  Left Breast: Medium  Left Nipple: Everted, Intact  Right Nipple: Everted, Intact  Breast- Feeding Assessment  Attends Breast-Feeding Classes: No  Breast-Feeding Experience: No  Breast Trauma/Surgery: No  Type/Quality: Good(Mother states she is breast/formula feeding baby. Instructed mother to offer baby breast 1st so that baby gets her antibodies)  Lactation Consultant Visits  Breast-Feedings: (Mother and baby for discharge today. Arnold Copeland states she last breast fed baby at 0800 for 8 minutes then gave baby 20 ml of formula.  Mother plans to wean formula after her milk comes in)

## 2019-05-21 NOTE — DISCHARGE SUMMARY
Obstetrical Discharge Summary     Name: Dillon Bill MRN: [id number]  SSN: [id number]    YOB: 1990  Age: 33 y.o. Sex: female      Allergies: Patient has no known allergies. Admit Date: 2019    Discharge Date: 2019     Admitting Physician: Juan Painter MD     Attending Physician:  Grant Aragon MD     * Admission Diagnoses: Normal labor and delivery [O80]    * Discharge Diagnoses:   Information for the patient's :  Luisa Luna [[id number]]   Delivery of a 7 lb 10.4 oz (3.47 kg) female infant via Vaginal, Spontaneous on 2019 at 1:02 AM  by . Apgars were 4 and 9.        Additional Diagnoses:   Hospital Problems as of 2019 Date Reviewed: 2019          Codes Class Noted - Resolved POA    Postpartum care following vaginal delivery ICD-10-CM: Z39.2  ICD-9-CM: V24.2  2019 - Present Unknown        RESOLVED: Normal labor and delivery ICD-10-CM: O80  ICD-9-CM: 059  2019 - 2019 Unknown        RESOLVED: Anemia affecting pregnancy in third trimester ICD-10-CM: O99.013  ICD-9-CM: 648.23, 285.9  3/18/2019 - 2019 Yes    Overview Signed 3/18/2019  9:25 AM by Derik Elena CNM     Floradix BID initiated             RESOLVED: Encounter for supervision of normal pregnancy in second trimester ICD-10-CM: Z34.92  ICD-9-CM: V22.1  2019 - 2019 Yes    Overview Addendum 2019 10:15 AM by Roshan Golden CNM       Collaborating MD  [facility]  JUAN PABLO by:   Genetic screening - declined  Social:  Lives by herself, FOB is involved somewhat  NOB labs normal  20 wk anatomy - placenta previa, resolved by 29 weeks  Gtt WNL  Labor support: FOB, [place], and mother  Postpartum support:  Going to parents home for 30 days  Tdap 19    Flu received    Labor Plans:  FOB and mother for support  Medical risk factors: none   issues of concern: previa, resolved  PP support: parents, family  Family planning:  Nexplanon Lab Results   Component Value Date/Time    Rubella, External negative 10/25/2018    GrBStrep, External NEGATIVE 2019    ABO,Rh O+ 10/25/2018      Immunization History   Administered Date(s) Administered    Influenza Vaccine 10/16/2008, 2010    Tdap 2019       * Procedures: Term spontaneous vaginal delivery of live female child  * No surgery found *      [place]  Depression Scale  I have been able to laugh and see the funny side of things: As much as I always could  I have looked forward with enjoyment to things: As much as I ever did  I have blamed myself unnecessarily when things went wrong: Not very often  I have been anxious or worried for no good reason: Hardly ever  I have felt scared or panicky for no very good reason: No, not at all  Things have been getting on top of me: No, most of the time I have coped quite well  I have been so unhappy that I have had difficulty sleeping: No, not at all  I have felt sad or miserable: Not very often  I have been so unhappy that I have been crying: No, never  The thought of harming myself has occurred to me: Never  Total Score: 4    * Discharge Condition: good    * Hospital Course: Normal hospital course following the delivery. * Disposition: Home    Discharge Medications:   Current Discharge Medication List          * Follow-up Care/Patient Instructions:   Activity: Activity as tolerated  Diet: Regular Diet  Wound Care: None needed    Follow-up Information     Follow up With Specialties Details Why Contact Info    Vika Manzanares CNM Certified Nurse Midwife In 2 weeks postpartum follow up [address]  [phone]

## 2019-05-21 NOTE — ROUTINE PROCESS
Reviewed discharge instructions with patient, she verbalized understanding. No prescriptions provided, documents signed. Follow up with midwife in 2 weeks.

## 2019-05-21 NOTE — PROGRESS NOTES
CNMPostpartumNoteDay2- Discharge     S: Patient ambulating and voiding without difficulty. Breastfeeding well. Started giving formula after she was told that the infant was dehydrated. Breasts now heavy. No complaints. Ready to go home. Bleeding is decreasing. Denies pain that is not managed with ibuprofen.      O: Vital signs stable, Heart RRR without murmur, Lungs CTA bilaterally, FF below umbilicus, lochia rubra light or moderate, breasts soft, nipples intact, 1+ edema of lower extremities bilat     A: Postpartum Day 2- , intact perineum  Breastfeeding  Stable     P: Follow routine postpartum discharge instructions  Discharge home with baby  Lactation support, discouraged formula feeding  Ibuprofen OTC up to 600 mg every 6 hours as needed for pain or cramping  Continue PNV while breastfeeding  Follow-up with CNM in 2 weeks as directed

## 2019-07-02 NOTE — PROGRESS NOTES
Chief Complaint   Patient presents with    Routine Prenatal Visit     Visit Vitals  /64   Wt 153 lb (69.4 kg)   LMP 08/11/2018 (Approximate)   BMI 26.26 kg/m²     1. Have you been to the ER, urgent care clinic since your last visit? Hospitalized since your last visit? No    2. Have you seen or consulted any other health care providers outside of the [address] [place] [address] since your last visit? Include any pap smears or colon screening. No    Here today for a routine prenatal visit and she has no complaints or concerns. After obtaining consent, and per orders of paul braswell, injection of tdap given in left deltoid by Mabel Peters RN. Patient instructed to remain in clinic for 20 minutes afterwards, and to report any adverse reaction to me immediately. Lot: [id number] Exp: 6/1/21 NDC: 33805-084-71 , VIS given. Name band;

## 2020-12-24 ENCOUNTER — HOSPITAL ENCOUNTER (EMERGENCY)
Age: 30
Discharge: HOME | End: 2020-12-24
Attending: EMERGENCY MEDICINE
Payer: OTHER GOVERNMENT

## 2020-12-24 VITALS
RESPIRATION RATE: 18 BRPM | OXYGEN SATURATION: 100 % | SYSTOLIC BLOOD PRESSURE: 155 MMHG | WEIGHT: 150 LBS | HEART RATE: 98 BPM | HEIGHT: 65 IN | TEMPERATURE: 98 F | DIASTOLIC BLOOD PRESSURE: 99 MMHG | BODY MASS INDEX: 24.99 KG/M2

## 2020-12-24 DIAGNOSIS — S01.81XA FACIAL LACERATION, INITIAL ENCOUNTER: Primary | ICD-10-CM

## 2020-12-24 PROCEDURE — 90471 IMMUNIZATION ADMIN: CPT

## 2020-12-24 PROCEDURE — 99284 EMERGENCY DEPT VISIT MOD MDM: CPT

## 2020-12-24 PROCEDURE — 99282 EMERGENCY DEPT VISIT SF MDM: CPT

## 2020-12-24 PROCEDURE — 74011250636 HC RX REV CODE- 250/636: Performed by: EMERGENCY MEDICINE

## 2020-12-24 PROCEDURE — 90715 TDAP VACCINE 7 YRS/> IM: CPT | Performed by: EMERGENCY MEDICINE

## 2020-12-24 PROCEDURE — 75810000293 HC SIMP/SUPERF WND  RPR

## 2020-12-24 RX ADMIN — TETANUS TOXOID, REDUCED DIPHTHERIA TOXOID AND ACELLULAR PERTUSSIS VACCINE, ADSORBED 0.5 ML: 5; 2.5; 8; 8; 2.5 SUSPENSION INTRAMUSCULAR at 01:25

## 2020-12-24 NOTE — ED PROVIDER NOTES
EMERGENCY DEPARTMENT HISTORY AND PHYSICAL EXAM    Date: 12/24/2020  Patient Name: Estelle Vallejo    History of Presenting Illness     Chief Complaint   Patient presents with    Other     chin injury         History Provided By: Patient    Additional History (Context):   Estelle Vallejo is a 30 y.o. female  presents to the emergency department via private vehicle C/O laceration to her chin after falling and hitting directly onto the ground. Patient is unsure of her last tetanus. Pt denies loss of consciousness, nausea, vomiting, neck pain or back pain, and any other sxs or complaints. PCP: Abeba Hameed MD    Current Outpatient Medications   Medication Sig Dispense Refill    potassium 99 mg tablet Take 99 mg by mouth daily.  ferrous sulfate (IRON) 325 mg (65 mg iron) tablet Take  by mouth two (2) times a day.  PNV MI.34/QYXYQUN fum/folic ac (PRENATAL PO) Take  by mouth. Past History     Past Medical History:  Past Medical History:   Diagnosis Date    Anemia affecting pregnancy in third trimester 3/18/2019       Past Surgical History:  Past Surgical History:   Procedure Laterality Date    HX WISDOM TEETH EXTRACTION         Family History:  Family History   Problem Relation Age of Onset    Breast Cancer Mother        Social History:  Social History     Tobacco Use    Smoking status: Never Smoker    Smokeless tobacco: Never Used   Substance Use Topics    Alcohol use: No     Frequency: Never    Drug use: No       Allergies:  No Known Allergies      Review of Systems   Review of Systems   Constitutional: Negative for chills and fever. HENT: Negative for congestion, ear pain, sinus pain and sore throat. Eyes: Negative for pain and visual disturbance. Respiratory: Negative for cough and shortness of breath. Cardiovascular: Negative for chest pain and leg swelling. Gastrointestinal: Negative for abdominal pain, constipation, diarrhea, nausea and vomiting.    Genitourinary: Negative for dysuria, hematuria, vaginal bleeding and vaginal discharge. Musculoskeletal: Negative for back pain and neck pain. Skin: Positive for wound. Negative for rash. Neurological: Negative for dizziness, tremors, weakness, light-headedness and numbness. All other systems reviewed and are negative. Physical Exam     Vitals:    12/24/20 0110   BP: (!) 155/99   Pulse: 98   Resp: 18   Temp: 98 °F (36.7 °C)   SpO2: 100%   Weight: 68 kg (150 lb)   Height: 5' 5\" (1.651 m)     Physical Exam  HENT:      Head:           Nursing note and vitals reviewed    Constitutional: Young -American female, anxious and tearful, nontoxic  Head: Normocephalic, 2 cm nonbleeding laceration to her mid chin. ENT: No trismus. No dental injury, no active bleeding or wound from the oral cavity  Eyes: Pupils are equal, round, and reactive to light, EOMI  Neck: Supple, non-tender  Cardiovascular: Regular rate and rhythm, no murmurs, rubs, or gallops, + 2 radial pulses bilaterally  Chest: Normal work of breathing and chest excursion bilaterally  Back: No evidence of trauma or deformity  Extremities: No evidence of trauma or deformity, no LE edema. No streaking erythema, vesicular lesions, ulcerations or bulla  Skin: Warm and dry, normal cap refill  Neuro: Alert and appropriate, CN intact, normal speech, moving all 4 extremities freely and symmetrically       Diagnostic Study Results     Labs -   No results found for this or any previous visit (from the past 12 hour(s)). Radiologic Studies -   No orders to display     CT Results  (Last 48 hours)    None        CXR Results  (Last 48 hours)    None            Medical Decision Making   I am the first provider for this patient. I reviewed the vital signs, available nursing notes, past medical history, past surgical history, family history and social history. Vital Signs-Reviewed the patient's vital signs.     Pulse Oximetry Analysis -100% on room air    Cardiac Monitor:  Rate: 98 bpm  Rhythm: Regular    Records Reviewed: Nursing Notes and Old Medical Records    Provider Notes:   30 y.o. female who presents with a laceration to her chin after falling. Tetanus is not up-to-date. On exam patient has a 2 cm laceration along her mid chin. It is gaping. Bleeding is well controlled. Will update tetanus. Will use sutures for repair. Procedures:  Wound Closure by Adhesive    Date/Time: 12/24/2020 2:02 AM  Performed by: Mary Anne Iverson DO  Authorized by: Mary Anne Iverson DO     Consent:     Consent obtained:  Verbal    Consent given by:  Patient    Risks discussed:  Infection, pain, poor cosmetic result, poor wound healing, need for additional repair and nerve damage    Alternatives discussed:  No treatment  Anesthesia (see MAR for exact dosages): Anesthesia method:  Local infiltration    Local anesthetic:  Lidocaine 1% w/o epi  Laceration details:     Location:  Face    Face location:  Chin    Length (cm):  2  Repair type:     Repair type:  Simple  Pre-procedure details:     Preparation:  Patient was prepped and draped in usual sterile fashion  Exploration:     Wound exploration: wound explored through full range of motion      Contaminated: no    Treatment:     Area cleansed with:  Hibiclens    Amount of cleaning:  Standard    Visualized foreign bodies/material removed: no    Skin repair:     Repair method:  Sutures    Suture size:  5-0    Number of sutures:  3  Approximation:     Approximation:  Close  Post-procedure details:     Dressing:  Open (no dressing)    Patient tolerance of procedure: Tolerated well, no immediate complications        ED Course:   1:06 AM   Initial assessment performed. The patients presenting problems have been discussed, and they are in agreement with the care plan formulated and outlined with them. I have encouraged them to ask questions as they arise throughout their visit.          Diagnosis and Disposition       DISCHARGE NOTE:  2:03 AM    Farida Prieto's  results have been reviewed with her. She has been counseled regarding her diagnosis, treatment, and plan. She verbally conveys understanding and agreement of the signs, symptoms, diagnosis, treatment and prognosis and additionally agrees to follow up as discussed. She also agrees with the care-plan and conveys that all of her questions have been answered. I have also provided discharge instructions for her that include: educational information regarding their diagnosis and treatment, and list of reasons why they would want to return to the ED prior to their follow-up appointment, should her condition change. She has been provided with education for proper emergency department utilization. CLINICAL IMPRESSION:    1. Facial laceration, initial encounter        PLAN:  1. D/C Home  2. Current Discharge Medication List        3. Follow-up Information     Follow up With Specialties Details Why [address]    [facility] Emergency Medicine In 10 days For suture removal [address] [address]  [phone]        ____________________________________     Please note that this dictation was completed with [employer], the computer voice recognition software. Quite often unanticipated grammatical, syntax, homophones, and other interpretive errors are inadvertently transcribed by the computer software. Please disregard these errors. Please excuse any errors that have escaped final proofreading.

## 2020-12-24 NOTE — DISCHARGE INSTRUCTIONS
You were seen and evaluated in the Emergency Department. Please understand that your work up is not all encompassing and you should follow up with your primary care physician for further management and continuity of care. Please return to Emergency Department or seek medical attention immediately if you have acute worsening in your symptoms or develop chest pain, shortness of breath, repeated vomiting, fever, altered level of consciousness, coughing up blood, or start sweating and feel clammy. If you were prescribed any medicine for home, please take as prescribed by your health-care provider. If you were given any follow-up appointments or numbers to call, please do so as instructed. Avoid any tobacco products or excessive alcohol.

## 2025-04-16 NOTE — ED TRIAGE NOTES
Pt states she dropped a knife while cutting something and it cut inside of her right ankle; No bleeding, scabbed over approx 3/4 inch superficial laceration
Accidental fall